# Patient Record
Sex: MALE | Race: WHITE | HISPANIC OR LATINO
[De-identification: names, ages, dates, MRNs, and addresses within clinical notes are randomized per-mention and may not be internally consistent; named-entity substitution may affect disease eponyms.]

---

## 2017-02-05 ENCOUNTER — TRANSCRIPTION ENCOUNTER (OUTPATIENT)
Age: 42
End: 2017-02-05

## 2017-11-09 ENCOUNTER — TRANSCRIPTION ENCOUNTER (OUTPATIENT)
Age: 42
End: 2017-11-09

## 2018-05-30 ENCOUNTER — OUTPATIENT (OUTPATIENT)
Dept: OUTPATIENT SERVICES | Facility: HOSPITAL | Age: 43
LOS: 1 days | Discharge: HOME | End: 2018-05-30

## 2018-05-30 VITALS
SYSTOLIC BLOOD PRESSURE: 150 MMHG | TEMPERATURE: 98 F | DIASTOLIC BLOOD PRESSURE: 86 MMHG | WEIGHT: 315 LBS | RESPIRATION RATE: 16 BRPM | OXYGEN SATURATION: 99 % | HEART RATE: 80 BPM

## 2018-05-30 DIAGNOSIS — Z01.818 ENCOUNTER FOR OTHER PREPROCEDURAL EXAMINATION: ICD-10-CM

## 2018-05-30 DIAGNOSIS — G56.01 CARPAL TUNNEL SYNDROME, RIGHT UPPER LIMB: ICD-10-CM

## 2018-05-30 DIAGNOSIS — Z96.7 PRESENCE OF OTHER BONE AND TENDON IMPLANTS: Chronic | ICD-10-CM

## 2018-05-30 DIAGNOSIS — Z98.890 OTHER SPECIFIED POSTPROCEDURAL STATES: Chronic | ICD-10-CM

## 2018-05-30 LAB
ALBUMIN SERPL ELPH-MCNC: 4.4 G/DL — SIGNIFICANT CHANGE UP (ref 3.5–5.2)
ALP SERPL-CCNC: 69 U/L — SIGNIFICANT CHANGE UP (ref 30–115)
ALT FLD-CCNC: 18 U/L — SIGNIFICANT CHANGE UP (ref 0–41)
ANION GAP SERPL CALC-SCNC: 16 MMOL/L — HIGH (ref 7–14)
APTT BLD: 29.8 SEC — SIGNIFICANT CHANGE UP (ref 27–39.2)
AST SERPL-CCNC: 17 U/L — SIGNIFICANT CHANGE UP (ref 0–41)
BASOPHILS # BLD AUTO: 0.07 K/UL — SIGNIFICANT CHANGE UP (ref 0–0.2)
BASOPHILS NFR BLD AUTO: 0.6 % — SIGNIFICANT CHANGE UP (ref 0–1)
BILIRUB SERPL-MCNC: 0.2 MG/DL — SIGNIFICANT CHANGE UP (ref 0.2–1.2)
BUN SERPL-MCNC: 15 MG/DL — SIGNIFICANT CHANGE UP (ref 10–20)
CALCIUM SERPL-MCNC: 9.3 MG/DL — SIGNIFICANT CHANGE UP (ref 8.5–10.1)
CHLORIDE SERPL-SCNC: 98 MMOL/L — SIGNIFICANT CHANGE UP (ref 98–110)
CO2 SERPL-SCNC: 24 MMOL/L — SIGNIFICANT CHANGE UP (ref 17–32)
CREAT SERPL-MCNC: 0.9 MG/DL — SIGNIFICANT CHANGE UP (ref 0.7–1.5)
EOSINOPHIL # BLD AUTO: 0.3 K/UL — SIGNIFICANT CHANGE UP (ref 0–0.7)
EOSINOPHIL NFR BLD AUTO: 2.5 % — SIGNIFICANT CHANGE UP (ref 0–8)
GLUCOSE SERPL-MCNC: 86 MG/DL — SIGNIFICANT CHANGE UP (ref 70–99)
HCT VFR BLD CALC: 41.1 % — LOW (ref 42–52)
HGB BLD-MCNC: 13.5 G/DL — LOW (ref 14–18)
IMM GRANULOCYTES NFR BLD AUTO: 0.6 % — HIGH (ref 0.1–0.3)
INR BLD: 1.14 RATIO — SIGNIFICANT CHANGE UP (ref 0.65–1.3)
LYMPHOCYTES # BLD AUTO: 2.79 K/UL — SIGNIFICANT CHANGE UP (ref 1.2–3.4)
LYMPHOCYTES # BLD AUTO: 23.1 % — SIGNIFICANT CHANGE UP (ref 20.5–51.1)
MCHC RBC-ENTMCNC: 26.8 PG — LOW (ref 27–31)
MCHC RBC-ENTMCNC: 32.8 G/DL — SIGNIFICANT CHANGE UP (ref 32–37)
MCV RBC AUTO: 81.7 FL — SIGNIFICANT CHANGE UP (ref 80–94)
MONOCYTES # BLD AUTO: 0.53 K/UL — SIGNIFICANT CHANGE UP (ref 0.1–0.6)
MONOCYTES NFR BLD AUTO: 4.4 % — SIGNIFICANT CHANGE UP (ref 1.7–9.3)
NEUTROPHILS # BLD AUTO: 8.31 K/UL — HIGH (ref 1.4–6.5)
NEUTROPHILS NFR BLD AUTO: 68.8 % — SIGNIFICANT CHANGE UP (ref 42.2–75.2)
NRBC # BLD: 0 /100 WBCS — SIGNIFICANT CHANGE UP (ref 0–0)
PLATELET # BLD AUTO: 312 K/UL — SIGNIFICANT CHANGE UP (ref 130–400)
POTASSIUM SERPL-MCNC: 4.4 MMOL/L — SIGNIFICANT CHANGE UP (ref 3.5–5)
POTASSIUM SERPL-SCNC: 4.4 MMOL/L — SIGNIFICANT CHANGE UP (ref 3.5–5)
PROT SERPL-MCNC: 7.2 G/DL — SIGNIFICANT CHANGE UP (ref 6–8)
PROTHROM AB SERPL-ACNC: 12.4 SEC — SIGNIFICANT CHANGE UP (ref 9.95–12.87)
RBC # BLD: 5.03 M/UL — SIGNIFICANT CHANGE UP (ref 4.7–6.1)
RBC # FLD: 14.6 % — HIGH (ref 11.5–14.5)
SODIUM SERPL-SCNC: 138 MMOL/L — SIGNIFICANT CHANGE UP (ref 135–146)
WBC # BLD: 12.07 K/UL — HIGH (ref 4.8–10.8)
WBC # FLD AUTO: 12.07 K/UL — HIGH (ref 4.8–10.8)

## 2018-05-30 NOTE — H&P PST ADULT - PSH
H/O arthroscopy of knee  RT -2017 -SPINAL  S/P ORIF (open reduction internal fixation) fracture  LT ANKLE

## 2018-05-30 NOTE — H&P PST ADULT - PMH
Arthritis  chronic pain RT KNEE  Asthma  RARE EPISODIC-LAST USE RESCUE INHALER ABOUT 2YRS AGO  Chronic neck and back pain    Obesities, morbid

## 2018-05-30 NOTE — H&P PST ADULT - NSANTHOSAYNRD_GEN_A_CORE
No. ABI screening performed.  STOP BANG Legend: 0-2 = LOW Risk; 3-4 = INTERMEDIATE Risk; 5-8 = HIGH Risk

## 2018-05-30 NOTE — H&P PST ADULT - HISTORY OF PRESENT ILLNESS
42YOM, States has numbness and pain b/l hands, rt worse than left, for RT CARPAL TUNNEL RELEASE FIRST. Denies c/o cp ,palp, uri ,fever, rash, sob,  or uti symptoms. Exercise naeem 1 FLAT BLOCK LTD BY PAIN RT KNEE. 42YOM,  has numbness and pain b/l hands, rt worse than left, for RT CARPAL TUNNEL RELEASE FIRST.  was injured by a steel beam at work site on Mar 23, 2017 in [facility] in [place], since then pain and numbness b/l arms, pain rt knee , neck and back.  Denies c/o cp ,palp, uri ,fever, rash, sob,  or uti symptoms. Exercise naeem 1 FLAT BLOCK LTD BY PAIN RT KNEE.

## 2018-06-12 ENCOUNTER — OUTPATIENT (OUTPATIENT)
Dept: OUTPATIENT SERVICES | Facility: HOSPITAL | Age: 43
LOS: 1 days | Discharge: HOME | End: 2018-06-12

## 2018-06-12 VITALS
TEMPERATURE: 98 F | HEART RATE: 70 BPM | SYSTOLIC BLOOD PRESSURE: 120 MMHG | DIASTOLIC BLOOD PRESSURE: 74 MMHG | WEIGHT: 315 LBS | RESPIRATION RATE: 16 BRPM | OXYGEN SATURATION: 97 %

## 2018-06-12 VITALS
HEART RATE: 67 BPM | DIASTOLIC BLOOD PRESSURE: 86 MMHG | RESPIRATION RATE: 17 BRPM | SYSTOLIC BLOOD PRESSURE: 138 MMHG | OXYGEN SATURATION: 98 %

## 2018-06-12 DIAGNOSIS — Z96.7 PRESENCE OF OTHER BONE AND TENDON IMPLANTS: Chronic | ICD-10-CM

## 2018-06-12 DIAGNOSIS — Z98.890 OTHER SPECIFIED POSTPROCEDURAL STATES: Chronic | ICD-10-CM

## 2018-06-12 RX ORDER — ACETAMINOPHEN 500 MG
975 TABLET ORAL ONCE
Qty: 0 | Refills: 0 | Status: COMPLETED | OUTPATIENT
Start: 2018-06-12 | End: 2018-06-12

## 2018-06-12 RX ORDER — ONDANSETRON 8 MG/1
4 TABLET, FILM COATED ORAL ONCE
Qty: 0 | Refills: 0 | Status: DISCONTINUED | OUTPATIENT
Start: 2018-06-12 | End: 2018-06-27

## 2018-06-12 RX ORDER — HYDROMORPHONE HYDROCHLORIDE 2 MG/ML
0.5 INJECTION INTRAMUSCULAR; INTRAVENOUS; SUBCUTANEOUS
Qty: 0 | Refills: 0 | Status: DISCONTINUED | OUTPATIENT
Start: 2018-06-12 | End: 2018-06-12

## 2018-06-12 RX ORDER — SODIUM CHLORIDE 9 MG/ML
1000 INJECTION, SOLUTION INTRAVENOUS
Qty: 0 | Refills: 0 | Status: DISCONTINUED | OUTPATIENT
Start: 2018-06-12 | End: 2018-06-27

## 2018-06-12 RX ORDER — HYDROMORPHONE HYDROCHLORIDE 2 MG/ML
1 INJECTION INTRAMUSCULAR; INTRAVENOUS; SUBCUTANEOUS
Qty: 0 | Refills: 0 | Status: DISCONTINUED | OUTPATIENT
Start: 2018-06-12 | End: 2018-06-12

## 2018-06-12 RX ORDER — SODIUM CHLORIDE 9 MG/ML
500 INJECTION, SOLUTION INTRAVENOUS ONCE
Qty: 0 | Refills: 0 | Status: COMPLETED | OUTPATIENT
Start: 2018-06-12 | End: 2018-06-12

## 2018-06-12 RX ORDER — OXYCODONE AND ACETAMINOPHEN 5; 325 MG/1; MG/1
1 TABLET ORAL EVERY 4 HOURS
Qty: 0 | Refills: 0 | Status: DISCONTINUED | OUTPATIENT
Start: 2018-06-12 | End: 2018-06-12

## 2018-06-12 RX ADMIN — SODIUM CHLORIDE 1000 MILLILITER(S): 9 INJECTION, SOLUTION INTRAVENOUS at 13:24

## 2018-06-12 RX ADMIN — Medication 975 MILLIGRAM(S): at 13:56

## 2018-06-12 RX ADMIN — SODIUM CHLORIDE 100 MILLILITER(S): 9 INJECTION, SOLUTION INTRAVENOUS at 13:52

## 2018-06-12 NOTE — ASU DISCHARGE PLAN (ADULT/PEDIATRIC). - SPECIAL INSTRUCTIONS
DIET:    Resume normal diet  No alcoholic  beverages for 24 hours or if on prescribed narcotic pain medications.    MEDICATION:    Resume your preoperative oral medications.  Check with your physician before starting aspirin, Coumadin, or other blood thinners.  Prescriptions given to you - take as directed.      ACTIVITY:    Rest today and limit your activities for 24 hours.  Do not drive or operate machinery for 24 hours - if you received anesthesia.  When taking pain medication, be careful as you walk or climb stairs.  It is not advisable to drive while taking prescribed pain medication.    SPECIAL INSTRUCTIONS:    ___x__ Elevate operative site above heart level or as directed.  __x___ Apply ice to operative site as directed.  _____ Use  sling as directed.  ___x__ Exercise fingers.    DRESSING CARE:    __x___ You may change the dressing 4 days. Keep wound covered with band-aids.  _____ Do not change the dressing until your doctor see you.  _____ You can loosen or rewrap the dressing.  _____  Keep dressing clean and dry.  _____ You may shower in _____ day(s) with the extremity covered by a plastic bag.  ___x__ OK to wash hand , including showers, in __4___ day(s).    ADDITIONAL  INFORMATION:    Post operative visit should be scheduled for next week.  If you are not aware of visit please contact office.  If you have any questions or concerns call office at      Notify your doctor if you develop   Fever  Excessive Swelling  Chills   Drainage   Pain not controlled by medication  Persistent numbness in hand or fingers    If an Emergency arises call 911 and/or go to the Emergency Room

## 2018-06-12 NOTE — BRIEF OPERATIVE NOTE - PROCEDURE
<<-----Click on this checkbox to enter Procedure Open release of right carpal tunnel  06/12/2018    Active  HARSHA

## 2018-06-14 DIAGNOSIS — G56.01 CARPAL TUNNEL SYNDROME, RIGHT UPPER LIMB: ICD-10-CM

## 2018-06-14 DIAGNOSIS — E66.01 MORBID (SEVERE) OBESITY DUE TO EXCESS CALORIES: ICD-10-CM

## 2018-09-25 PROBLEM — E66.01 MORBID (SEVERE) OBESITY DUE TO EXCESS CALORIES: Chronic | Status: ACTIVE | Noted: 2018-05-30

## 2018-09-25 PROBLEM — J45.909 UNSPECIFIED ASTHMA, UNCOMPLICATED: Chronic | Status: ACTIVE | Noted: 2018-05-30

## 2018-09-25 PROBLEM — M54.2 CERVICALGIA: Chronic | Status: ACTIVE | Noted: 2018-05-30

## 2018-10-10 ENCOUNTER — OUTPATIENT (OUTPATIENT)
Dept: OUTPATIENT SERVICES | Facility: HOSPITAL | Age: 43
LOS: 1 days | Discharge: HOME | End: 2018-10-10

## 2018-10-10 VITALS
WEIGHT: 315 LBS | DIASTOLIC BLOOD PRESSURE: 86 MMHG | RESPIRATION RATE: 17 BRPM | TEMPERATURE: 97 F | HEART RATE: 76 BPM | SYSTOLIC BLOOD PRESSURE: 131 MMHG | OXYGEN SATURATION: 98 % | HEIGHT: 70 IN

## 2018-10-10 DIAGNOSIS — Z01.818 ENCOUNTER FOR OTHER PREPROCEDURAL EXAMINATION: ICD-10-CM

## 2018-10-10 DIAGNOSIS — G56.02 CARPAL TUNNEL SYNDROME, LEFT UPPER LIMB: ICD-10-CM

## 2018-10-10 DIAGNOSIS — Z98.890 OTHER SPECIFIED POSTPROCEDURAL STATES: Chronic | ICD-10-CM

## 2018-10-10 DIAGNOSIS — Z96.7 PRESENCE OF OTHER BONE AND TENDON IMPLANTS: Chronic | ICD-10-CM

## 2018-10-10 LAB
ALBUMIN SERPL ELPH-MCNC: 4.5 G/DL — SIGNIFICANT CHANGE UP (ref 3.5–5.2)
ALP SERPL-CCNC: 73 U/L — SIGNIFICANT CHANGE UP (ref 30–115)
ALT FLD-CCNC: 23 U/L — SIGNIFICANT CHANGE UP (ref 0–41)
ANION GAP SERPL CALC-SCNC: 19 MMOL/L — HIGH (ref 7–14)
APTT BLD: 30.8 SEC — SIGNIFICANT CHANGE UP (ref 27–39.2)
AST SERPL-CCNC: 16 U/L — SIGNIFICANT CHANGE UP (ref 0–41)
BASOPHILS # BLD AUTO: 0.05 K/UL — SIGNIFICANT CHANGE UP (ref 0–0.2)
BASOPHILS NFR BLD AUTO: 0.6 % — SIGNIFICANT CHANGE UP (ref 0–1)
BILIRUB SERPL-MCNC: <0.2 MG/DL — SIGNIFICANT CHANGE UP (ref 0.2–1.2)
BUN SERPL-MCNC: 21 MG/DL — HIGH (ref 10–20)
CALCIUM SERPL-MCNC: 9.2 MG/DL — SIGNIFICANT CHANGE UP (ref 8.5–10.1)
CHLORIDE SERPL-SCNC: 98 MMOL/L — SIGNIFICANT CHANGE UP (ref 98–110)
CO2 SERPL-SCNC: 22 MMOL/L — SIGNIFICANT CHANGE UP (ref 17–32)
CREAT SERPL-MCNC: 1 MG/DL — SIGNIFICANT CHANGE UP (ref 0.7–1.5)
EOSINOPHIL # BLD AUTO: 0.3 K/UL — SIGNIFICANT CHANGE UP (ref 0–0.7)
EOSINOPHIL NFR BLD AUTO: 3.3 % — SIGNIFICANT CHANGE UP (ref 0–8)
GLUCOSE SERPL-MCNC: 80 MG/DL — SIGNIFICANT CHANGE UP (ref 70–99)
HCT VFR BLD CALC: 41.8 % — LOW (ref 42–52)
HGB BLD-MCNC: 13.6 G/DL — LOW (ref 14–18)
IMM GRANULOCYTES NFR BLD AUTO: 0.4 % — HIGH (ref 0.1–0.3)
INR BLD: 1.12 RATIO — SIGNIFICANT CHANGE UP (ref 0.65–1.3)
LYMPHOCYTES # BLD AUTO: 2.78 K/UL — SIGNIFICANT CHANGE UP (ref 1.2–3.4)
LYMPHOCYTES # BLD AUTO: 31 % — SIGNIFICANT CHANGE UP (ref 20.5–51.1)
MCHC RBC-ENTMCNC: 27.1 PG — SIGNIFICANT CHANGE UP (ref 27–31)
MCHC RBC-ENTMCNC: 32.5 G/DL — SIGNIFICANT CHANGE UP (ref 32–37)
MCV RBC AUTO: 83.4 FL — SIGNIFICANT CHANGE UP (ref 80–94)
MONOCYTES # BLD AUTO: 0.56 K/UL — SIGNIFICANT CHANGE UP (ref 0.1–0.6)
MONOCYTES NFR BLD AUTO: 6.3 % — SIGNIFICANT CHANGE UP (ref 1.7–9.3)
NEUTROPHILS # BLD AUTO: 5.23 K/UL — SIGNIFICANT CHANGE UP (ref 1.4–6.5)
NEUTROPHILS NFR BLD AUTO: 58.4 % — SIGNIFICANT CHANGE UP (ref 42.2–75.2)
PLATELET # BLD AUTO: 334 K/UL — SIGNIFICANT CHANGE UP (ref 130–400)
POTASSIUM SERPL-MCNC: 5.1 MMOL/L — HIGH (ref 3.5–5)
POTASSIUM SERPL-SCNC: 5.1 MMOL/L — HIGH (ref 3.5–5)
PROT SERPL-MCNC: 7.5 G/DL — SIGNIFICANT CHANGE UP (ref 6–8)
PROTHROM AB SERPL-ACNC: 12.1 SEC — SIGNIFICANT CHANGE UP (ref 9.95–12.87)
RBC # BLD: 5.01 M/UL — SIGNIFICANT CHANGE UP (ref 4.7–6.1)
RBC # FLD: 14.4 % — SIGNIFICANT CHANGE UP (ref 11.5–14.5)
SODIUM SERPL-SCNC: 139 MMOL/L — SIGNIFICANT CHANGE UP (ref 135–146)
WBC # BLD: 8.96 K/UL — SIGNIFICANT CHANGE UP (ref 4.8–10.8)
WBC # FLD AUTO: 8.96 K/UL — SIGNIFICANT CHANGE UP (ref 4.8–10.8)

## 2018-10-10 NOTE — H&P PST ADULT - PMH
Arthritis  chronic pain RT KNEE  Asthma  RARE EPISODIC-LAST USE RESCUE INHALER ABOUT 2YRS AGO  Bilateral carpal tunnel syndrome    Chronic neck and back pain    Obesities, morbid

## 2018-10-10 NOTE — H&P PST ADULT - REASON FOR ADMISSION
43 yr old Male with hx of Carpal tunnel Syndrome. Pt electing to have Carpal Tunnel Release with Dr. Manzanares on 10/24/18

## 2018-10-10 NOTE — H&P PST ADULT - PSH
H/O arthroscopy of knee  RT -2017 -SPINAL  History of carpal tunnel release of both wrists  06/2018 RIght  S/P ORIF (open reduction internal fixation) fracture  LT ANKLE

## 2018-10-10 NOTE — H&P PST ADULT - HISTORY OF PRESENT ILLNESS
43 yr old Male with hx of Carpal tunnel Syndrome. Pt electing to have Carpal Tunnel Release   Pt at present denies any CP, SOB, Palpitations, or Dysuria  PT states he can climb 1 FOS with no SOB  Pt denies ABI    As per pt this is a complete medical and surgical assessment including prescribed and OTC medications 43 yr old morbidly obese Male ambulates with cane due to prior RT ankle sx and LT knee issues with hx of Carpal tunnel Syndrome. Pt electing to have Carpal Tunnel Release   Pt at present denies any CP, SOB, Palpitations, or Dysuria  PT states he can climb 1 FOS with no SOB  Pt denies ABI    As per pt this is a complete medical and surgical assessment including prescribed and OTC medications      As per pt this is a worker's comp case. He had a work related injury

## 2018-10-10 NOTE — H&P PST ADULT - FAMILY HISTORY
Diabetes, kideny transplant 3 yrs ago     Father  Still living? No  Cirrhosis of liver, Age at diagnosis: Age Unknown

## 2018-10-24 ENCOUNTER — OUTPATIENT (OUTPATIENT)
Dept: OUTPATIENT SERVICES | Facility: HOSPITAL | Age: 43
LOS: 1 days | Discharge: HOME | End: 2018-10-24

## 2018-10-24 VITALS
OXYGEN SATURATION: 96 % | SYSTOLIC BLOOD PRESSURE: 140 MMHG | HEART RATE: 76 BPM | DIASTOLIC BLOOD PRESSURE: 65 MMHG | RESPIRATION RATE: 19 BRPM

## 2018-10-24 VITALS
HEIGHT: 70 IN | DIASTOLIC BLOOD PRESSURE: 65 MMHG | OXYGEN SATURATION: 96 % | RESPIRATION RATE: 18 BRPM | WEIGHT: 315 LBS | HEART RATE: 76 BPM | TEMPERATURE: 99 F | SYSTOLIC BLOOD PRESSURE: 128 MMHG

## 2018-10-24 DIAGNOSIS — Z96.7 PRESENCE OF OTHER BONE AND TENDON IMPLANTS: Chronic | ICD-10-CM

## 2018-10-24 DIAGNOSIS — Z98.890 OTHER SPECIFIED POSTPROCEDURAL STATES: Chronic | ICD-10-CM

## 2018-10-24 RX ORDER — ACETAMINOPHEN 500 MG
650 TABLET ORAL ONCE
Qty: 0 | Refills: 0 | Status: DISCONTINUED | OUTPATIENT
Start: 2018-10-24 | End: 2018-11-08

## 2018-10-24 RX ORDER — OXYCODONE AND ACETAMINOPHEN 5; 325 MG/1; MG/1
1 TABLET ORAL ONCE
Qty: 0 | Refills: 0 | Status: DISCONTINUED | OUTPATIENT
Start: 2018-10-24 | End: 2018-10-24

## 2018-10-24 RX ORDER — HYDROMORPHONE HYDROCHLORIDE 2 MG/ML
0.5 INJECTION INTRAMUSCULAR; INTRAVENOUS; SUBCUTANEOUS
Qty: 0 | Refills: 0 | Status: DISCONTINUED | OUTPATIENT
Start: 2018-10-24 | End: 2018-10-24

## 2018-10-24 RX ORDER — KETOROLAC TROMETHAMINE 30 MG/ML
15 SYRINGE (ML) INJECTION ONCE
Qty: 0 | Refills: 0 | Status: COMPLETED | OUTPATIENT
Start: 2018-10-24 | End: 2018-10-29

## 2018-10-24 RX ORDER — SODIUM CHLORIDE 9 MG/ML
1000 INJECTION, SOLUTION INTRAVENOUS
Qty: 0 | Refills: 0 | Status: DISCONTINUED | OUTPATIENT
Start: 2018-10-24 | End: 2018-11-08

## 2018-10-24 RX ORDER — KETOROLAC TROMETHAMINE 30 MG/ML
15 SYRINGE (ML) INJECTION ONCE
Qty: 0 | Refills: 0 | Status: DISCONTINUED | OUTPATIENT
Start: 2018-10-24 | End: 2018-10-24

## 2018-10-24 RX ORDER — ACETAMINOPHEN 500 MG
1000 TABLET ORAL ONCE
Qty: 0 | Refills: 0 | Status: DISCONTINUED | OUTPATIENT
Start: 2018-10-24 | End: 2018-11-08

## 2018-10-24 RX ORDER — ONDANSETRON 8 MG/1
4 TABLET, FILM COATED ORAL ONCE
Qty: 0 | Refills: 0 | Status: DISCONTINUED | OUTPATIENT
Start: 2018-10-24 | End: 2018-11-08

## 2018-10-24 RX ORDER — IBUPROFEN 200 MG
1 TABLET ORAL
Qty: 20 | Refills: 0
Start: 2018-10-24

## 2018-10-24 RX ADMIN — SODIUM CHLORIDE 75 MILLILITER(S): 9 INJECTION, SOLUTION INTRAVENOUS at 16:09

## 2018-10-24 NOTE — BRIEF OPERATIVE NOTE - PROCEDURE
<<-----Click on this checkbox to enter Procedure Open release of left carpal tunnel  10/24/2018    Active  HARSHA

## 2018-10-24 NOTE — ASU DISCHARGE PLAN (ADULT/PEDIATRIC). - SPECIAL INSTRUCTIONS
DIET:    Resume normal diet  No alcoholic  beverages for 24 hours or if on prescribed narcotic pain medications.    MEDICATION:    Resume your preoperative oral medications.  Check with your physician before starting aspirin, Coumadin, or other blood thinners.  Prescriptions given to you - take as directed.      ACTIVITY:    Rest today and limit your activities for 24 hours.  Do not drive or operate machinery for 24 hours - if you received anesthesia.  When taking pain medication, be careful as you walk or climb stairs.  It is not advisable to drive while taking prescribed pain medication.    SPECIAL INSTRUCTIONS:    ___x__ Elevate operative site above heart level or as directed.  __x___ Apply ice to operative site as directed.  _____ Use  sling as directed.  ___x__ Exercise fingers.    DRESSING CARE:    ___x__ You may change the dressing 4 days. Keep wound covered with band-aids.  _____ Do not change the dressing until your doctor see you.  _____ You can loosen or rewrap the dressing.  _____  Keep dressing clean and dry.  _____ You may shower in _____ day(s) with the extremity covered by a plastic bag.  __x___ OK to wash hand , including showers, in _4____ day(s).    ADDITIONAL  INFORMATION:    Post operative visit should be scheduled for next week.  If you are not aware of visit please contact office.  If you have any questions or concerns call office at      Notify your doctor if you develop   Fever  Excessive Swelling  Chills   Drainage   Pain not controlled by medication  Persistent numbness in hand or fingers    If an Emergency arises call 911 and/or go to the Emergency Room

## 2018-10-30 DIAGNOSIS — E66.01 MORBID (SEVERE) OBESITY DUE TO EXCESS CALORIES: ICD-10-CM

## 2018-10-30 DIAGNOSIS — J45.909 UNSPECIFIED ASTHMA, UNCOMPLICATED: ICD-10-CM

## 2018-10-30 DIAGNOSIS — F17.210 NICOTINE DEPENDENCE, CIGARETTES, UNCOMPLICATED: ICD-10-CM

## 2018-10-30 DIAGNOSIS — G89.29 OTHER CHRONIC PAIN: ICD-10-CM

## 2018-10-30 DIAGNOSIS — Z79.82 LONG TERM (CURRENT) USE OF ASPIRIN: ICD-10-CM

## 2018-10-30 DIAGNOSIS — G56.02 CARPAL TUNNEL SYNDROME, LEFT UPPER LIMB: ICD-10-CM

## 2018-10-30 DIAGNOSIS — Z83.3 FAMILY HISTORY OF DIABETES MELLITUS: ICD-10-CM

## 2019-06-25 NOTE — H&P PST ADULT - MUSCULOSKELETAL COMMENTS
PT complaints of general body aches Complex Repair Preamble Text (Leave Blank If You Do Not Want): Extensive wide undermining was performed.

## 2020-08-26 DIAGNOSIS — Z86.59 PERSONAL HISTORY OF OTHER MENTAL AND BEHAVIORAL DISORDERS: ICD-10-CM

## 2020-08-26 PROBLEM — Z00.00 ENCOUNTER FOR PREVENTIVE HEALTH EXAMINATION: Status: ACTIVE | Noted: 2020-08-26

## 2020-08-26 RX ORDER — TRAMADOL HYDROCHLORIDE 50 MG/1
50 TABLET, COATED ORAL EVERY 6 HOURS
Refills: 0 | Status: ACTIVE | COMMUNITY

## 2020-08-26 RX ORDER — GABAPENTIN 800 MG/1
800 TABLET, COATED ORAL 4 TIMES DAILY
Refills: 0 | Status: ACTIVE | COMMUNITY

## 2020-09-04 ENCOUNTER — APPOINTMENT (OUTPATIENT)
Dept: SURGERY | Facility: CLINIC | Age: 45
End: 2020-09-04
Payer: MEDICARE

## 2020-09-04 VITALS
TEMPERATURE: 98.2 F | HEIGHT: 67 IN | BODY MASS INDEX: 49.44 KG/M2 | SYSTOLIC BLOOD PRESSURE: 136 MMHG | DIASTOLIC BLOOD PRESSURE: 84 MMHG | WEIGHT: 315 LBS

## 2020-09-04 DIAGNOSIS — F17.200 NICOTINE DEPENDENCE, UNSPECIFIED, UNCOMPLICATED: ICD-10-CM

## 2020-09-04 DIAGNOSIS — Z83.3 FAMILY HISTORY OF DIABETES MELLITUS: ICD-10-CM

## 2020-09-04 PROCEDURE — 99204 OFFICE O/P NEW MOD 45 MIN: CPT

## 2020-09-09 ENCOUNTER — APPOINTMENT (OUTPATIENT)
Dept: SURGERY | Facility: CLINIC | Age: 45
End: 2020-09-09

## 2020-09-16 ENCOUNTER — APPOINTMENT (OUTPATIENT)
Dept: SURGERY | Facility: CLINIC | Age: 45
End: 2020-09-16
Payer: SELF-PAY

## 2020-09-16 PROCEDURE — SI006: CPT

## 2020-10-01 ENCOUNTER — APPOINTMENT (OUTPATIENT)
Dept: SURGERY | Facility: CLINIC | Age: 45
End: 2020-10-01
Payer: MEDICARE

## 2020-10-01 VITALS — HEIGHT: 67 IN | BODY MASS INDEX: 49.44 KG/M2 | WEIGHT: 315 LBS

## 2020-10-01 PROCEDURE — ZZZZZ: CPT

## 2020-10-02 ENCOUNTER — TRANSCRIPTION ENCOUNTER (OUTPATIENT)
Age: 45
End: 2020-10-02

## 2020-10-12 ENCOUNTER — OUTPATIENT (OUTPATIENT)
Dept: OUTPATIENT SERVICES | Facility: HOSPITAL | Age: 45
LOS: 1 days | Discharge: HOME | End: 2020-10-12

## 2020-10-12 DIAGNOSIS — F50.9 EATING DISORDER, UNSPECIFIED: ICD-10-CM

## 2020-10-12 DIAGNOSIS — Z98.890 OTHER SPECIFIED POSTPROCEDURAL STATES: Chronic | ICD-10-CM

## 2020-10-12 DIAGNOSIS — Z96.7 PRESENCE OF OTHER BONE AND TENDON IMPLANTS: Chronic | ICD-10-CM

## 2020-10-13 ENCOUNTER — OUTPATIENT (OUTPATIENT)
Dept: OUTPATIENT SERVICES | Facility: HOSPITAL | Age: 45
LOS: 1 days | Discharge: HOME | End: 2020-10-13

## 2020-10-13 DIAGNOSIS — M17.10 UNILATERAL PRIMARY OSTEOARTHRITIS, UNSPECIFIED KNEE: ICD-10-CM

## 2020-10-13 DIAGNOSIS — Z98.890 OTHER SPECIFIED POSTPROCEDURAL STATES: Chronic | ICD-10-CM

## 2020-10-13 DIAGNOSIS — Z96.7 PRESENCE OF OTHER BONE AND TENDON IMPLANTS: Chronic | ICD-10-CM

## 2020-10-13 DIAGNOSIS — M54.5 LOW BACK PAIN: ICD-10-CM

## 2020-10-16 DIAGNOSIS — F50.9 EATING DISORDER, UNSPECIFIED: ICD-10-CM

## 2020-10-21 NOTE — PLAN
[FreeTextEntry1] : PLAN: Smoking cessation.\par            Education class.\par            Nutrition evaluation.\par            Call with concerns.

## 2020-10-21 NOTE — CONSULT LETTER
[Courtesy Letter:] : I had the pleasure of seeing your patient, [unfilled], in my office today. [Please see my note below.] : Please see my note below. [Sincerely,] : Sincerely, [Consult Closing:] : Thank you very much for allowing me to participate in the care of this patient.  If you have any questions, please do not hesitate to contact me. [Dear  ___] : Dear  [unfilled], [FreeTextEntry3] : Mansi Little MD, FACS, FASMBS, Diplo ABOM\par Director, Comprehensive [facility]\par Director, Medical Weight [facility]\par Chief, General, Bariatric & Minimally Invasive Surgery\par Director, Quality & Performance Improvement\par Department of Surgery\par [facility] \par [facility]\par

## 2020-10-21 NOTE — ASSESSMENT
[FreeTextEntry1] : RIGOBERTO LAKE is a 45 year male seen today for Bariatric consultation. The surgical options for weight loss have been extensively discussed with the patient and questions answered. The patient was provided written education regarding the Sleeve Gastrectomy. The patient appears to have a reasonable understanding of the process and is ready to proceed.  Risks, including but not limited to:  anesthesia, death, bleeding, infection, leaks, blood clots, ulcers, malnutrition and need for additional operations have been reviewed.  The importance of a consistent diet and exercise regimen in regards to weight loss and maintenance has also been discussed and the patient agrees to actively participate in the process.  The importance of lifelong mineral and vitamin supplementation was also reviewed with the patient. The need to adhere to an appropriate diet and exercise regimen were emphasized; in particular the need to perform moderate to intense physical activity most days of the week.  No promises have been made in regards to any given outcome and possibility of inadequate weight loss as well as regain has been discussed with the patient.  The patient understands that a long-term commitment is necessary for long-term success.\par

## 2020-10-21 NOTE — PHYSICAL EXAM
[Normal] : affect appropriate [de-identified] : Mallampati IV [de-identified] : Soft, nondistended.

## 2020-10-21 NOTE — REVIEW OF SYSTEMS
[Joint Pain] : joint pain [Joint Stiffness] : joint stiffness [Muscle Pain] : muscle pain [Negative] : Endocrine [FreeTextEntry9] : knees and back

## 2020-10-21 NOTE — HISTORY OF PRESENT ILLNESS
[de-identified] : 45 year old male with a BMI of 65 who presents today for Bariatric consultation. He has tried multiple weight loss modalities in the past such as portion control and a Low CHO diet without any long term success. He has been obese for several years and he is seeking surgery for improvement of his overall health and comorbid conditions. Patient states that since his work related accident in 2017, he has been suffering from severe pain in his knees and back. He ambulates either with 2 canes or a walker. He is not able to walk for more that 5 minutes before he has to rest. Encouraged patient to do what he is able to every few hours. He has over a 30 year history of smoking and currently smokes 2 PPD. Smoking cessation was strongly advised as we will not proceed with surgery unless he is nicotine free for 2 months.

## 2020-11-05 ENCOUNTER — APPOINTMENT (OUTPATIENT)
Dept: SURGERY | Facility: CLINIC | Age: 45
End: 2020-11-05
Payer: MEDICARE

## 2020-11-05 VITALS — WEIGHT: 315 LBS | BODY MASS INDEX: 49.44 KG/M2 | HEIGHT: 67 IN

## 2020-11-05 PROCEDURE — ZZZZZ: CPT

## 2020-11-20 ENCOUNTER — OUTPATIENT (OUTPATIENT)
Dept: OUTPATIENT SERVICES | Facility: HOSPITAL | Age: 45
LOS: 1 days | Discharge: HOME | End: 2020-11-20

## 2020-11-20 DIAGNOSIS — Z11.59 ENCOUNTER FOR SCREENING FOR OTHER VIRAL DISEASES: ICD-10-CM

## 2020-11-20 DIAGNOSIS — Z96.7 PRESENCE OF OTHER BONE AND TENDON IMPLANTS: Chronic | ICD-10-CM

## 2020-11-20 DIAGNOSIS — Z98.890 OTHER SPECIFIED POSTPROCEDURAL STATES: Chronic | ICD-10-CM

## 2020-11-23 ENCOUNTER — TRANSCRIPTION ENCOUNTER (OUTPATIENT)
Age: 45
End: 2020-11-23

## 2020-11-23 ENCOUNTER — OUTPATIENT (OUTPATIENT)
Dept: OUTPATIENT SERVICES | Facility: HOSPITAL | Age: 45
LOS: 1 days | Discharge: HOME | End: 2020-11-23
Payer: MEDICARE

## 2020-11-23 ENCOUNTER — RESULT REVIEW (OUTPATIENT)
Age: 45
End: 2020-11-23

## 2020-11-23 VITALS
SYSTOLIC BLOOD PRESSURE: 163 MMHG | DIASTOLIC BLOOD PRESSURE: 93 MMHG | HEART RATE: 99 BPM | OXYGEN SATURATION: 97 % | RESPIRATION RATE: 18 BRPM

## 2020-11-23 VITALS
SYSTOLIC BLOOD PRESSURE: 141 MMHG | DIASTOLIC BLOOD PRESSURE: 91 MMHG | HEIGHT: 71 IN | TEMPERATURE: 98 F | WEIGHT: 315 LBS | HEART RATE: 91 BPM | RESPIRATION RATE: 18 BRPM

## 2020-11-23 DIAGNOSIS — Z98.890 OTHER SPECIFIED POSTPROCEDURAL STATES: Chronic | ICD-10-CM

## 2020-11-23 DIAGNOSIS — Z96.7 PRESENCE OF OTHER BONE AND TENDON IMPLANTS: Chronic | ICD-10-CM

## 2020-11-23 PROCEDURE — 88312 SPECIAL STAINS GROUP 1: CPT | Mod: 26

## 2020-11-23 PROCEDURE — 88305 TISSUE EXAM BY PATHOLOGIST: CPT | Mod: 26

## 2020-11-23 NOTE — H&P PST ADULT - NSICDXPASTSURGICALHX_GEN_ALL_CORE_FT
PAST SURGICAL HISTORY:  H/O arthroscopy of knee RT -2017 -SPINAL    History of carpal tunnel release of both wrists 06/2018 RIght    S/P ORIF (open reduction internal fixation) fracture LT ANKLE

## 2020-11-23 NOTE — CHART NOTE - NSCHARTNOTEFT_GEN_A_CORE
PACU ANESTHESIA ADMISSION NOTE      Procedure: EGD  Post op diagnosis:  GERD    _  __X__  Patent Airway    __X__  Full return of protective reflexes    __X__  Full recovery from anesthesia / back to baseline     Vitals:   T:    97.712       R:                  BP:         143.86         Sat:   99%                P: 87      Mental Status:  __X__ Awake   _____ Alert   _____ Drowsy   _____ Sedated    Nausea/Vomiting:  _X___ NO  ______Yes,   See Post - Op Orders          Pain Scale (0-10):  _____    Treatment: ____ None    _X___ See Post - Op/PCA Orders    Post - Operative Fluids:   ____ Oral   ___X_ See Post - Op Orders    Plan: Discharge:   __X__Home       _____Floor     _____Critical Care    _____  Other:_________________    Comments:    Pt tolerated procedure well, no anesthesia related complications. Care of pt endorsed to PACU, report given to PACU RN. Discharge when criteria are met.

## 2020-11-23 NOTE — H&P PST ADULT - VENOUS THROMBOEMBOLISM
Received fax from [facility] requesting visit notes from 2/27/20 visit from Dr. Logan Ritchie. Authorization For Release of Medical Record Information was received as patient has a visit with PCP.   Visit notes were faxed at 11:14 am.
no

## 2020-11-23 NOTE — H&P PST ADULT - NSICDXPASTMEDICALHX_GEN_ALL_CORE_FT
PAST MEDICAL HISTORY:  Arthritis chronic pain RT KNEE    Asthma RARE EPISODIC-LAST USE RESCUE INHALER ABOUT 2YRS AGO    Bilateral carpal tunnel syndrome     Chronic neck and back pain     Obesities, morbid

## 2020-11-23 NOTE — PRE-ANESTHESIA EVALUATION ADULT - NSANTHPMHFT_GEN_ALL_CORE
morbid obesity  likely ABI  smoker 1 PPD X 30 yrs  chric pain on tramadol,   walk with cane support  last asthma attack 2 yrs ago  lungs clear, no murmur

## 2020-11-23 NOTE — H&P PST ADULT - NSICDXFAMILYHX_GEN_ALL_CORE_FT
FAMILY HISTORY:  Diabetes, kideny transplant 3 yrs ago    Father  Still living? No  Cirrhosis of liver, Age at diagnosis: Age Unknown

## 2020-11-23 NOTE — ASU DISCHARGE PLAN (ADULT/PEDIATRIC) - CALL YOUR DOCTOR IF YOU HAVE ANY OF THE FOLLOWING:
Pain not relieved by Medications/Fever greater than (need to indicate Fahrenheit or Celsius)/Bleeding that does not stop/Inability to tolerate liquids or foods

## 2020-11-25 LAB — SURGICAL PATHOLOGY STUDY: SIGNIFICANT CHANGE UP

## 2020-11-26 DIAGNOSIS — E66.01 MORBID (SEVERE) OBESITY DUE TO EXCESS CALORIES: ICD-10-CM

## 2020-11-26 DIAGNOSIS — J45.909 UNSPECIFIED ASTHMA, UNCOMPLICATED: ICD-10-CM

## 2020-11-26 DIAGNOSIS — Z79.82 LONG TERM (CURRENT) USE OF ASPIRIN: ICD-10-CM

## 2020-11-26 DIAGNOSIS — K20.90 ESOPHAGITIS, UNSPECIFIED WITHOUT BLEEDING: ICD-10-CM

## 2020-11-26 DIAGNOSIS — G89.29 OTHER CHRONIC PAIN: ICD-10-CM

## 2020-11-26 DIAGNOSIS — Z01.818 ENCOUNTER FOR OTHER PREPROCEDURAL EXAMINATION: ICD-10-CM

## 2020-11-26 DIAGNOSIS — K29.50 UNSPECIFIED CHRONIC GASTRITIS WITHOUT BLEEDING: ICD-10-CM

## 2020-12-03 ENCOUNTER — APPOINTMENT (OUTPATIENT)
Dept: SURGERY | Facility: CLINIC | Age: 45
End: 2020-12-03
Payer: MEDICARE

## 2020-12-03 VITALS — WEIGHT: 315 LBS | HEIGHT: 67 IN | BODY MASS INDEX: 49.44 KG/M2

## 2020-12-03 PROCEDURE — ZZZZZ: CPT

## 2021-01-14 ENCOUNTER — APPOINTMENT (OUTPATIENT)
Dept: SURGERY | Facility: CLINIC | Age: 46
End: 2021-01-14
Payer: MEDICARE

## 2021-01-14 VITALS — WEIGHT: 315 LBS | BODY MASS INDEX: 49.44 KG/M2 | HEIGHT: 67 IN

## 2021-01-14 PROCEDURE — ZZZZZ: CPT

## 2021-01-17 ENCOUNTER — OUTPATIENT (OUTPATIENT)
Dept: OUTPATIENT SERVICES | Facility: HOSPITAL | Age: 46
LOS: 1 days | Discharge: HOME | End: 2021-01-17
Payer: MEDICARE

## 2021-01-17 ENCOUNTER — RESULT REVIEW (OUTPATIENT)
Age: 46
End: 2021-01-17

## 2021-01-17 DIAGNOSIS — E66.01 MORBID (SEVERE) OBESITY DUE TO EXCESS CALORIES: ICD-10-CM

## 2021-01-17 DIAGNOSIS — Z98.890 OTHER SPECIFIED POSTPROCEDURAL STATES: Chronic | ICD-10-CM

## 2021-01-17 DIAGNOSIS — Z96.7 PRESENCE OF OTHER BONE AND TENDON IMPLANTS: Chronic | ICD-10-CM

## 2021-01-17 DIAGNOSIS — R10.9 UNSPECIFIED ABDOMINAL PAIN: ICD-10-CM

## 2021-01-17 PROCEDURE — 76700 US EXAM ABDOM COMPLETE: CPT | Mod: 26

## 2021-01-19 ENCOUNTER — NON-APPOINTMENT (OUTPATIENT)
Age: 46
End: 2021-01-19

## 2021-01-20 ENCOUNTER — NON-APPOINTMENT (OUTPATIENT)
Age: 46
End: 2021-01-20

## 2021-02-11 ENCOUNTER — APPOINTMENT (OUTPATIENT)
Dept: SURGERY | Facility: CLINIC | Age: 46
End: 2021-02-11
Payer: MEDICARE

## 2021-02-11 VITALS — HEIGHT: 67 IN | WEIGHT: 315 LBS | BODY MASS INDEX: 49.44 KG/M2

## 2021-02-11 PROCEDURE — ZZZZZ: CPT

## 2021-03-18 ENCOUNTER — APPOINTMENT (OUTPATIENT)
Dept: SURGERY | Facility: CLINIC | Age: 46
End: 2021-03-18
Payer: MEDICARE

## 2021-03-18 VITALS — HEIGHT: 67 IN | BODY MASS INDEX: 49.44 KG/M2 | WEIGHT: 315 LBS

## 2021-03-18 PROCEDURE — ZZZZZ: CPT

## 2021-03-29 ENCOUNTER — LABORATORY RESULT (OUTPATIENT)
Age: 46
End: 2021-03-29

## 2021-04-06 ENCOUNTER — NON-APPOINTMENT (OUTPATIENT)
Age: 46
End: 2021-04-06

## 2021-04-26 ENCOUNTER — OUTPATIENT (OUTPATIENT)
Dept: OUTPATIENT SERVICES | Facility: HOSPITAL | Age: 46
LOS: 1 days | Discharge: HOME | End: 2021-04-26

## 2021-04-26 DIAGNOSIS — Z96.7 PRESENCE OF OTHER BONE AND TENDON IMPLANTS: Chronic | ICD-10-CM

## 2021-04-26 DIAGNOSIS — Z98.890 OTHER SPECIFIED POSTPROCEDURAL STATES: Chronic | ICD-10-CM

## 2021-04-26 DIAGNOSIS — M17.10 UNILATERAL PRIMARY OSTEOARTHRITIS, UNSPECIFIED KNEE: ICD-10-CM

## 2021-04-26 DIAGNOSIS — M54.5 LOW BACK PAIN: ICD-10-CM

## 2021-06-18 ENCOUNTER — APPOINTMENT (OUTPATIENT)
Dept: SURGERY | Facility: CLINIC | Age: 46
End: 2021-06-18
Payer: MEDICARE

## 2021-06-18 PROCEDURE — 99202 OFFICE O/P NEW SF 15 MIN: CPT | Mod: 95

## 2021-06-22 NOTE — ASSESSMENT
[FreeTextEntry1] : Patient currently being evaluated for bariatric surgery.  Pt initially reported interest in the Sleeve Gastrectomy.  Now, the patient wishes to proceed with the Gastric Bypass.  Pt stated that he realizes the he would be better served by undergoing the gastric bypass in respect to his expected weight loss goal.  The surgical options for weight loss have been extensively discussed with the patient and questions answered. The patient was previously provided written and verbal education regarding the Lap Sleeve and LGB. He was encouraged to review the binder.  The patient appears to have a reasonable understanding of the process and is ready to proceed. Risks including but not limited to: anesthesia, death, bleeding, infection, leaks, blood clots, ulcers, malnutrition and need for additional operations have been reviewed.  The importance of a consistent diet and exercise regimen in regards to weight loss and maintenance has also been discussed and the patient agrees to actively participate in this process.The importance of lifelong mineral and vitamin supplementation was also reviewed with the patient. The need to adhere to an appropriate diet and exercise regimen were emphasized; in particular the need to perform moderate to intense physical activity most days of the week. No promises have been made in regards to any given outcome and possibility of inadequate weight loss as well as weight regain has been discussed with the patient. The patient understands that a lifelong commitment is necessary for long-term success.\par \par Given the patient's stated desires and understanding, we will proceed with the preoperative evaluation towards a lap gastric bypass.  All questions were answered' patient was encouraged to contact us should he have additional questions.

## 2021-06-22 NOTE — HISTORY OF PRESENT ILLNESS
[Home] : at home, [unfilled] , at the time of the visit. [Medical Office: ([facility])___] : at the medical office located in  [Verbal consent obtained from patient] : the patient, [unfilled]

## 2021-06-29 ENCOUNTER — NON-APPOINTMENT (OUTPATIENT)
Age: 46
End: 2021-06-29

## 2021-07-14 NOTE — ASU DISCHARGE PLAN (ADULT/PEDIATRIC) - ASU DISCHARGE DATE/TIME
ROS:  GEN: (-) fevers/chills  NECK: (-) stiffness, (-) swelling  RESP: (-) shortness of breath, (-) cough  CV: (+) chest pain, (-) palpitations  GI: (-) nausea, (-) vomiting, (-) pain, (-) constipation, (-) diarrhea  : (-) hematuria, (-) dysuria  EXT: (-) edema   NEURO: (-) weakness, (-) headache, (-) dizziness, (-) syncope  MSK: (-) muscle pain 23-Nov-2020 15:14

## 2021-08-25 ENCOUNTER — APPOINTMENT (OUTPATIENT)
Dept: SURGERY | Facility: CLINIC | Age: 46
End: 2021-08-25
Payer: MEDICARE

## 2021-08-25 VITALS
HEIGHT: 67 IN | HEART RATE: 86 BPM | WEIGHT: 315 LBS | TEMPERATURE: 97.1 F | DIASTOLIC BLOOD PRESSURE: 80 MMHG | BODY MASS INDEX: 49.44 KG/M2 | SYSTOLIC BLOOD PRESSURE: 140 MMHG | OXYGEN SATURATION: 99 %

## 2021-08-25 PROCEDURE — 99213 OFFICE O/P EST LOW 20 MIN: CPT

## 2021-08-25 RX ORDER — TIZANIDINE 4 MG/1
4 TABLET ORAL 3 TIMES DAILY
Refills: 0 | Status: DISCONTINUED | COMMUNITY
End: 2021-08-25

## 2021-08-25 NOTE — HISTORY OF PRESENT ILLNESS
[de-identified] : Jacky has completed his preoperative evaluations as he prepares for gastric bypass.

## 2021-08-25 NOTE — PLAN
[FreeTextEntry1] : We discussed how smoking after gastric bypass can lead to life threatening complications like ulcers.  He has not smoked in several months. He inquired about medical marijuana since his pain management doctor has prescribed it for him and it helps control his chronic pain.  \ajith Will reach out to his pain management physician for a letter stating that is necessary for pain control.\ajith Rico will follow up again for his preoperative consent visit.

## 2021-09-10 RX ORDER — TRAMADOL HYDROCHLORIDE 100 MG/1
100 TABLET, EXTENDED RELEASE ORAL
Refills: 0 | Status: DISCONTINUED | COMMUNITY
End: 2021-09-10

## 2021-09-10 RX ORDER — MELOXICAM 15 MG/1
15 TABLET ORAL
Refills: 0 | Status: DISCONTINUED | COMMUNITY
End: 2021-09-10

## 2021-09-10 RX ORDER — ASPIRIN 81 MG
81 TABLET, DELAYED RELEASE (ENTERIC COATED) ORAL DAILY
Refills: 0 | Status: DISCONTINUED | COMMUNITY
End: 2021-09-10

## 2021-09-13 RX ORDER — OMEPRAZOLE 40 MG/1
40 CAPSULE, DELAYED RELEASE ORAL
Qty: 30 | Refills: 2 | Status: COMPLETED | COMMUNITY
Start: 2021-09-13 | End: 2021-12-20

## 2021-09-15 ENCOUNTER — OUTPATIENT (OUTPATIENT)
Dept: OUTPATIENT SERVICES | Facility: HOSPITAL | Age: 46
LOS: 1 days | Discharge: HOME | End: 2021-09-15
Payer: MEDICARE

## 2021-09-15 ENCOUNTER — RESULT REVIEW (OUTPATIENT)
Age: 46
End: 2021-09-15

## 2021-09-15 ENCOUNTER — APPOINTMENT (OUTPATIENT)
Dept: SURGERY | Facility: CLINIC | Age: 46
End: 2021-09-15
Payer: MEDICARE

## 2021-09-15 VITALS
BODY MASS INDEX: 49.44 KG/M2 | HEIGHT: 67 IN | TEMPERATURE: 97.8 F | DIASTOLIC BLOOD PRESSURE: 86 MMHG | SYSTOLIC BLOOD PRESSURE: 142 MMHG | WEIGHT: 315 LBS | OXYGEN SATURATION: 98 % | HEART RATE: 90 BPM

## 2021-09-15 VITALS
TEMPERATURE: 99 F | DIASTOLIC BLOOD PRESSURE: 87 MMHG | WEIGHT: 315 LBS | HEART RATE: 96 BPM | HEIGHT: 71 IN | RESPIRATION RATE: 16 BRPM | SYSTOLIC BLOOD PRESSURE: 158 MMHG | OXYGEN SATURATION: 97 %

## 2021-09-15 DIAGNOSIS — R06.00 DYSPNEA, UNSPECIFIED: ICD-10-CM

## 2021-09-15 DIAGNOSIS — Z01.818 ENCOUNTER FOR OTHER PREPROCEDURAL EXAMINATION: ICD-10-CM

## 2021-09-15 DIAGNOSIS — Z96.7 PRESENCE OF OTHER BONE AND TENDON IMPLANTS: Chronic | ICD-10-CM

## 2021-09-15 DIAGNOSIS — E66.01 MORBID (SEVERE) OBESITY DUE TO EXCESS CALORIES: ICD-10-CM

## 2021-09-15 DIAGNOSIS — Z98.890 OTHER SPECIFIED POSTPROCEDURAL STATES: Chronic | ICD-10-CM

## 2021-09-15 LAB
A1C WITH ESTIMATED AVERAGE GLUCOSE RESULT: 5.6 % — SIGNIFICANT CHANGE UP (ref 4–5.6)
ALBUMIN SERPL ELPH-MCNC: 4.2 G/DL — SIGNIFICANT CHANGE UP (ref 3.5–5.2)
ALP SERPL-CCNC: 72 U/L — SIGNIFICANT CHANGE UP (ref 30–115)
ALT FLD-CCNC: 13 U/L — SIGNIFICANT CHANGE UP (ref 0–41)
ANION GAP SERPL CALC-SCNC: 13 MMOL/L — SIGNIFICANT CHANGE UP (ref 7–14)
APTT BLD: 33.1 SEC — SIGNIFICANT CHANGE UP (ref 27–39.2)
AST SERPL-CCNC: 13 U/L — SIGNIFICANT CHANGE UP (ref 0–41)
BASOPHILS # BLD AUTO: 0.04 K/UL — SIGNIFICANT CHANGE UP (ref 0–0.2)
BASOPHILS NFR BLD AUTO: 0.4 % — SIGNIFICANT CHANGE UP (ref 0–1)
BILIRUB SERPL-MCNC: <0.2 MG/DL — SIGNIFICANT CHANGE UP (ref 0.2–1.2)
BLD GP AB SCN SERPL QL: SIGNIFICANT CHANGE UP
BUN SERPL-MCNC: 20 MG/DL — SIGNIFICANT CHANGE UP (ref 10–20)
CALCIUM SERPL-MCNC: 9.2 MG/DL — SIGNIFICANT CHANGE UP (ref 8.5–10.1)
CHLORIDE SERPL-SCNC: 101 MMOL/L — SIGNIFICANT CHANGE UP (ref 98–110)
CO2 SERPL-SCNC: 28 MMOL/L — SIGNIFICANT CHANGE UP (ref 17–32)
CREAT SERPL-MCNC: 1 MG/DL — SIGNIFICANT CHANGE UP (ref 0.7–1.5)
EOSINOPHIL # BLD AUTO: 0.28 K/UL — SIGNIFICANT CHANGE UP (ref 0–0.7)
EOSINOPHIL NFR BLD AUTO: 2.6 % — SIGNIFICANT CHANGE UP (ref 0–8)
ESTIMATED AVERAGE GLUCOSE: 114 MG/DL — SIGNIFICANT CHANGE UP (ref 68–114)
GLUCOSE SERPL-MCNC: 101 MG/DL — HIGH (ref 70–99)
HCT VFR BLD CALC: 37.4 % — LOW (ref 42–52)
HGB BLD-MCNC: 11.8 G/DL — LOW (ref 14–18)
IMM GRANULOCYTES NFR BLD AUTO: 0.3 % — SIGNIFICANT CHANGE UP (ref 0.1–0.3)
INR BLD: 1.06 RATIO — SIGNIFICANT CHANGE UP (ref 0.65–1.3)
LYMPHOCYTES # BLD AUTO: 3.38 K/UL — SIGNIFICANT CHANGE UP (ref 1.2–3.4)
LYMPHOCYTES # BLD AUTO: 31.1 % — SIGNIFICANT CHANGE UP (ref 20.5–51.1)
MCHC RBC-ENTMCNC: 26.5 PG — LOW (ref 27–31)
MCHC RBC-ENTMCNC: 31.6 G/DL — LOW (ref 32–37)
MCV RBC AUTO: 83.9 FL — SIGNIFICANT CHANGE UP (ref 80–94)
MONOCYTES # BLD AUTO: 0.55 K/UL — SIGNIFICANT CHANGE UP (ref 0.1–0.6)
MONOCYTES NFR BLD AUTO: 5.1 % — SIGNIFICANT CHANGE UP (ref 1.7–9.3)
NEUTROPHILS # BLD AUTO: 6.58 K/UL — HIGH (ref 1.4–6.5)
NEUTROPHILS NFR BLD AUTO: 60.5 % — SIGNIFICANT CHANGE UP (ref 42.2–75.2)
NRBC # BLD: 0 /100 WBCS — SIGNIFICANT CHANGE UP (ref 0–0)
PLATELET # BLD AUTO: 345 K/UL — SIGNIFICANT CHANGE UP (ref 130–400)
POTASSIUM SERPL-MCNC: 4.4 MMOL/L — SIGNIFICANT CHANGE UP (ref 3.5–5)
POTASSIUM SERPL-SCNC: 4.4 MMOL/L — SIGNIFICANT CHANGE UP (ref 3.5–5)
PROT SERPL-MCNC: 7.5 G/DL — SIGNIFICANT CHANGE UP (ref 6–8)
PROTHROM AB SERPL-ACNC: 12.2 SEC — SIGNIFICANT CHANGE UP (ref 9.95–12.87)
RBC # BLD: 4.46 M/UL — LOW (ref 4.7–6.1)
RBC # FLD: 14.7 % — HIGH (ref 11.5–14.5)
SODIUM SERPL-SCNC: 142 MMOL/L — SIGNIFICANT CHANGE UP (ref 135–146)
WBC # BLD: 10.86 K/UL — HIGH (ref 4.8–10.8)
WBC # FLD AUTO: 10.86 K/UL — HIGH (ref 4.8–10.8)

## 2021-09-15 PROCEDURE — 71046 X-RAY EXAM CHEST 2 VIEWS: CPT | Mod: 26

## 2021-09-15 PROCEDURE — 93010 ELECTROCARDIOGRAM REPORT: CPT

## 2021-09-15 PROCEDURE — 99213 OFFICE O/P EST LOW 20 MIN: CPT

## 2021-09-15 RX ORDER — LOSARTAN POTASSIUM 100 MG/1
1 TABLET, FILM COATED ORAL
Qty: 0 | Refills: 0 | DISCHARGE

## 2021-09-15 RX ORDER — GABAPENTIN 400 MG/1
1 CAPSULE ORAL
Qty: 0 | Refills: 0 | DISCHARGE

## 2021-09-15 NOTE — H&P PST ADULT - NSICDXPASTMEDICALHX_GEN_ALL_CORE_FT
PAST MEDICAL HISTORY:  Arthritis chronic pain RT KNEE    Asthma RARE EPISODIC-LAST USE RESCUE INHALER ABOUT 2YRS AGO    Bilateral carpal tunnel syndrome & nerve damage in bilateral hands    Chronic neck and back pain     COPD (chronic obstructive pulmonary disease)     Fatty liver     HTN (hypertension)     Obesities, morbid     ABI (obstructive sleep apnea)

## 2021-09-15 NOTE — H&P PST ADULT - HISTORY OF PRESENT ILLNESS
Pt states he has been overweight for his whole life. Since 2017 he had a work accident and has not been as active as he would like causing worsening weight gain. Pt states he has tried dieting and other methods but has all been unsuccessful. Now for listed procedure.      Denies any chest pain, difficulty breathing, SOB, palpitations, dysuria, URI, or any other infections in the last 2 weeks. Denies any recent travel, contact, or exposure to any persons with known or suspected COVID-19. Pt also denies COVID testing within the last 2 weeks. Pt admits to receiving all doses of Pfizer COVID vaccine. Denies any suicidal or homicidal ideations. Pt advised to self quarantine until day of procedure. Poor exercise tolerance as pt is unable to climb a flight of stairs without dyspnea mainly d/t bilateral knee pain. ABI reviewed with patient. Pt verbalized understanding of all pre-operative instructions.    Anesthesia Alert  NO--Difficult Airway  NO--History of neck surgery or radiation  YES--Limited ROM of neck  NO--History of Malignant hyperthermia  NO--No personal or family history of Pseudocholinesterase deficiency.  YES--Prior Anesthesia Complication: high blood pressure after surgery (? as per pt)  NO--Latex Allergy  NO--Loose teeth  NO--History of Rheumatoid Arthritis  YES--ABI  NO--Bleeding Risk  NO--Other_____

## 2021-09-15 NOTE — H&P PST ADULT - PRO ARRIVE FROM
How Severe Are Your Spot(S)?: moderate What Is The Reason For Today's Visit?: Skin Lesions What Is The Reason For Today's Visit? (Being Monitored For X): concerning skin lesions on an annual basis home

## 2021-09-17 ENCOUNTER — OUTPATIENT (OUTPATIENT)
Dept: OUTPATIENT SERVICES | Facility: HOSPITAL | Age: 46
LOS: 1 days | Discharge: HOME | End: 2021-09-17

## 2021-09-17 DIAGNOSIS — Z96.7 PRESENCE OF OTHER BONE AND TENDON IMPLANTS: Chronic | ICD-10-CM

## 2021-09-17 DIAGNOSIS — Z98.890 OTHER SPECIFIED POSTPROCEDURAL STATES: Chronic | ICD-10-CM

## 2021-09-17 DIAGNOSIS — Z11.59 ENCOUNTER FOR SCREENING FOR OTHER VIRAL DISEASES: ICD-10-CM

## 2021-09-17 PROBLEM — I10 ESSENTIAL (PRIMARY) HYPERTENSION: Chronic | Status: ACTIVE | Noted: 2021-09-15

## 2021-09-17 PROBLEM — G47.33 OBSTRUCTIVE SLEEP APNEA (ADULT) (PEDIATRIC): Chronic | Status: ACTIVE | Noted: 2021-09-15

## 2021-09-17 PROBLEM — J44.9 CHRONIC OBSTRUCTIVE PULMONARY DISEASE, UNSPECIFIED: Chronic | Status: ACTIVE | Noted: 2021-09-15

## 2021-09-17 PROBLEM — G56.03 CARPAL TUNNEL SYNDROME, BILATERAL UPPER LIMBS: Chronic | Status: ACTIVE | Noted: 2018-10-10

## 2021-09-17 PROBLEM — K76.0 FATTY (CHANGE OF) LIVER, NOT ELSEWHERE CLASSIFIED: Chronic | Status: ACTIVE | Noted: 2021-09-15

## 2021-09-20 ENCOUNTER — APPOINTMENT (OUTPATIENT)
Dept: SURGERY | Facility: HOSPITAL | Age: 46
End: 2021-09-20

## 2021-09-20 ENCOUNTER — INPATIENT (INPATIENT)
Facility: HOSPITAL | Age: 46
LOS: 0 days | Discharge: HOME | End: 2021-09-21
Attending: SURGERY | Admitting: SURGERY
Payer: MEDICARE

## 2021-09-20 VITALS
SYSTOLIC BLOOD PRESSURE: 129 MMHG | WEIGHT: 315 LBS | RESPIRATION RATE: 18 BRPM | OXYGEN SATURATION: 95 % | HEIGHT: 71 IN | TEMPERATURE: 99 F | DIASTOLIC BLOOD PRESSURE: 75 MMHG | HEART RATE: 89 BPM

## 2021-09-20 DIAGNOSIS — Z98.890 OTHER SPECIFIED POSTPROCEDURAL STATES: Chronic | ICD-10-CM

## 2021-09-20 DIAGNOSIS — Z96.7 PRESENCE OF OTHER BONE AND TENDON IMPLANTS: Chronic | ICD-10-CM

## 2021-09-20 LAB
ALBUMIN SERPL ELPH-MCNC: 3.9 G/DL — SIGNIFICANT CHANGE UP (ref 3.5–5.2)
ALP SERPL-CCNC: 62 U/L — SIGNIFICANT CHANGE UP (ref 30–115)
ALT FLD-CCNC: 48 U/L — HIGH (ref 0–41)
ANION GAP SERPL CALC-SCNC: 13 MMOL/L — SIGNIFICANT CHANGE UP (ref 7–14)
AST SERPL-CCNC: 46 U/L — HIGH (ref 0–41)
BILIRUB SERPL-MCNC: 0.2 MG/DL — SIGNIFICANT CHANGE UP (ref 0.2–1.2)
BUN SERPL-MCNC: 21 MG/DL — HIGH (ref 10–20)
CALCIUM SERPL-MCNC: 8.7 MG/DL — SIGNIFICANT CHANGE UP (ref 8.5–10.1)
CHLORIDE SERPL-SCNC: 102 MMOL/L — SIGNIFICANT CHANGE UP (ref 98–110)
CK MB CFR SERPL CALC: 2.4 NG/ML — SIGNIFICANT CHANGE UP (ref 0.6–6.3)
CK SERPL-CCNC: 69 U/L — SIGNIFICANT CHANGE UP (ref 0–225)
CO2 SERPL-SCNC: 25 MMOL/L — SIGNIFICANT CHANGE UP (ref 17–32)
CREAT SERPL-MCNC: 1.2 MG/DL — SIGNIFICANT CHANGE UP (ref 0.7–1.5)
GLUCOSE BLDC GLUCOMTR-MCNC: 100 MG/DL — HIGH (ref 70–99)
GLUCOSE BLDC GLUCOMTR-MCNC: 120 MG/DL — HIGH (ref 70–99)
GLUCOSE SERPL-MCNC: 128 MG/DL — HIGH (ref 70–99)
HCT VFR BLD CALC: 33.8 % — LOW (ref 42–52)
HCT VFR BLD CALC: 36 % — LOW (ref 42–52)
HGB BLD-MCNC: 11 G/DL — LOW (ref 14–18)
HGB BLD-MCNC: 11.5 G/DL — LOW (ref 14–18)
MAGNESIUM SERPL-MCNC: 1.7 MG/DL — LOW (ref 1.8–2.4)
MCHC RBC-ENTMCNC: 26.4 PG — LOW (ref 27–31)
MCHC RBC-ENTMCNC: 26.8 PG — LOW (ref 27–31)
MCHC RBC-ENTMCNC: 31.9 G/DL — LOW (ref 32–37)
MCHC RBC-ENTMCNC: 32.5 G/DL — SIGNIFICANT CHANGE UP (ref 32–37)
MCV RBC AUTO: 82.2 FL — SIGNIFICANT CHANGE UP (ref 80–94)
MCV RBC AUTO: 82.8 FL — SIGNIFICANT CHANGE UP (ref 80–94)
NRBC # BLD: 0 /100 WBCS — SIGNIFICANT CHANGE UP (ref 0–0)
NRBC # BLD: 0 /100 WBCS — SIGNIFICANT CHANGE UP (ref 0–0)
PHOSPHATE SERPL-MCNC: 4 MG/DL — SIGNIFICANT CHANGE UP (ref 2.1–4.9)
PLATELET # BLD AUTO: 298 K/UL — SIGNIFICANT CHANGE UP (ref 130–400)
PLATELET # BLD AUTO: 308 K/UL — SIGNIFICANT CHANGE UP (ref 130–400)
POTASSIUM SERPL-MCNC: 4.6 MMOL/L — SIGNIFICANT CHANGE UP (ref 3.5–5)
POTASSIUM SERPL-SCNC: 4.6 MMOL/L — SIGNIFICANT CHANGE UP (ref 3.5–5)
PROT SERPL-MCNC: 6.9 G/DL — SIGNIFICANT CHANGE UP (ref 6–8)
RBC # BLD: 4.11 M/UL — LOW (ref 4.7–6.1)
RBC # BLD: 4.35 M/UL — LOW (ref 4.7–6.1)
RBC # FLD: 14.6 % — HIGH (ref 11.5–14.5)
RBC # FLD: 14.7 % — HIGH (ref 11.5–14.5)
SODIUM SERPL-SCNC: 140 MMOL/L — SIGNIFICANT CHANGE UP (ref 135–146)
TROPONIN T SERPL-MCNC: <0.01 NG/ML — SIGNIFICANT CHANGE UP
WBC # BLD: 16.71 K/UL — HIGH (ref 4.8–10.8)
WBC # BLD: 17.85 K/UL — HIGH (ref 4.8–10.8)
WBC # FLD AUTO: 16.71 K/UL — HIGH (ref 4.8–10.8)
WBC # FLD AUTO: 17.85 K/UL — HIGH (ref 4.8–10.8)

## 2021-09-20 PROCEDURE — 93010 ELECTROCARDIOGRAM REPORT: CPT

## 2021-09-20 PROCEDURE — 43644 LAP GASTRIC BYPASS/ROUX-EN-Y: CPT

## 2021-09-20 RX ORDER — ENOXAPARIN SODIUM 100 MG/ML
40 INJECTION SUBCUTANEOUS ONCE
Refills: 0 | Status: COMPLETED | OUTPATIENT
Start: 2021-09-20 | End: 2021-09-20

## 2021-09-20 RX ORDER — MEPERIDINE HYDROCHLORIDE 50 MG/ML
12.5 INJECTION INTRAMUSCULAR; INTRAVENOUS; SUBCUTANEOUS
Refills: 0 | Status: DISCONTINUED | OUTPATIENT
Start: 2021-09-20 | End: 2021-09-20

## 2021-09-20 RX ORDER — KETOROLAC TROMETHAMINE 30 MG/ML
15 SYRINGE (ML) INJECTION EVERY 8 HOURS
Refills: 0 | Status: DISCONTINUED | OUTPATIENT
Start: 2021-09-20 | End: 2021-09-21

## 2021-09-20 RX ORDER — SODIUM CHLORIDE 9 MG/ML
1000 INJECTION, SOLUTION INTRAVENOUS
Refills: 0 | Status: DISCONTINUED | OUTPATIENT
Start: 2021-09-20 | End: 2021-09-20

## 2021-09-20 RX ORDER — ACETAMINOPHEN 500 MG
1000 TABLET ORAL ONCE
Refills: 0 | Status: COMPLETED | OUTPATIENT
Start: 2021-09-21 | End: 2021-09-21

## 2021-09-20 RX ORDER — MAGNESIUM SULFATE 500 MG/ML
2 VIAL (ML) INJECTION ONCE
Refills: 0 | Status: COMPLETED | OUTPATIENT
Start: 2021-09-20 | End: 2021-09-20

## 2021-09-20 RX ORDER — ACETAMINOPHEN 500 MG
1000 TABLET ORAL ONCE
Refills: 0 | Status: DISCONTINUED | OUTPATIENT
Start: 2021-09-20 | End: 2021-09-21

## 2021-09-20 RX ORDER — GABAPENTIN 400 MG/1
800 CAPSULE ORAL
Refills: 0 | Status: DISCONTINUED | OUTPATIENT
Start: 2021-09-20 | End: 2021-09-21

## 2021-09-20 RX ORDER — PANTOPRAZOLE SODIUM 20 MG/1
40 TABLET, DELAYED RELEASE ORAL DAILY
Refills: 0 | Status: DISCONTINUED | OUTPATIENT
Start: 2021-09-21 | End: 2021-09-21

## 2021-09-20 RX ORDER — HYDROMORPHONE HYDROCHLORIDE 2 MG/ML
1 INJECTION INTRAMUSCULAR; INTRAVENOUS; SUBCUTANEOUS
Refills: 0 | Status: DISCONTINUED | OUTPATIENT
Start: 2021-09-20 | End: 2021-09-20

## 2021-09-20 RX ORDER — ACETAMINOPHEN 500 MG
1000 TABLET ORAL ONCE
Refills: 0 | Status: DISCONTINUED | OUTPATIENT
Start: 2021-09-20 | End: 2021-09-20

## 2021-09-20 RX ORDER — BUPIVACAINE 13.3 MG/ML
20 INJECTION, SUSPENSION, LIPOSOMAL INFILTRATION ONCE
Refills: 0 | Status: DISCONTINUED | OUTPATIENT
Start: 2021-09-20 | End: 2021-09-20

## 2021-09-20 RX ORDER — ACETAMINOPHEN 500 MG
1000 TABLET ORAL ONCE
Refills: 0 | Status: COMPLETED | OUTPATIENT
Start: 2021-09-20 | End: 2021-09-20

## 2021-09-20 RX ORDER — ENOXAPARIN SODIUM 100 MG/ML
40 INJECTION SUBCUTANEOUS EVERY 12 HOURS
Refills: 0 | Status: DISCONTINUED | OUTPATIENT
Start: 2021-09-20 | End: 2021-09-21

## 2021-09-20 RX ORDER — ONDANSETRON 8 MG/1
4 TABLET, FILM COATED ORAL EVERY 6 HOURS
Refills: 0 | Status: DISCONTINUED | OUTPATIENT
Start: 2021-09-20 | End: 2021-09-20

## 2021-09-20 RX ORDER — SODIUM CHLORIDE 9 MG/ML
1000 INJECTION, SOLUTION INTRAVENOUS
Refills: 0 | Status: DISCONTINUED | OUTPATIENT
Start: 2021-09-20 | End: 2021-09-21

## 2021-09-20 RX ORDER — ONDANSETRON 8 MG/1
4 TABLET, FILM COATED ORAL ONCE
Refills: 0 | Status: DISCONTINUED | OUTPATIENT
Start: 2021-09-20 | End: 2021-09-21

## 2021-09-20 RX ORDER — HYDROMORPHONE HYDROCHLORIDE 2 MG/ML
0.5 INJECTION INTRAMUSCULAR; INTRAVENOUS; SUBCUTANEOUS
Refills: 0 | Status: DISCONTINUED | OUTPATIENT
Start: 2021-09-20 | End: 2021-09-21

## 2021-09-20 RX ORDER — LOSARTAN POTASSIUM 100 MG/1
100 TABLET, FILM COATED ORAL DAILY
Refills: 0 | Status: DISCONTINUED | OUTPATIENT
Start: 2021-09-20 | End: 2021-09-21

## 2021-09-20 RX ORDER — FAMOTIDINE 10 MG/ML
20 INJECTION INTRAVENOUS ONCE
Refills: 0 | Status: COMPLETED | OUTPATIENT
Start: 2021-09-20 | End: 2021-09-20

## 2021-09-20 RX ORDER — TRAMADOL HYDROCHLORIDE 50 MG/1
50 TABLET ORAL EVERY 4 HOURS
Refills: 0 | Status: DISCONTINUED | OUTPATIENT
Start: 2021-09-20 | End: 2021-09-21

## 2021-09-20 RX ADMIN — Medication 50 GRAM(S): at 16:09

## 2021-09-20 RX ADMIN — GABAPENTIN 800 MILLIGRAM(S): 400 CAPSULE ORAL at 23:35

## 2021-09-20 RX ADMIN — LOSARTAN POTASSIUM 100 MILLIGRAM(S): 100 TABLET, FILM COATED ORAL at 16:56

## 2021-09-20 RX ADMIN — Medication 15 MILLIGRAM(S): at 17:00

## 2021-09-20 RX ADMIN — GABAPENTIN 800 MILLIGRAM(S): 400 CAPSULE ORAL at 18:47

## 2021-09-20 RX ADMIN — Medication 400 MILLIGRAM(S): at 21:21

## 2021-09-20 RX ADMIN — ENOXAPARIN SODIUM 40 MILLIGRAM(S): 100 INJECTION SUBCUTANEOUS at 07:33

## 2021-09-20 RX ADMIN — ENOXAPARIN SODIUM 40 MILLIGRAM(S): 100 INJECTION SUBCUTANEOUS at 19:01

## 2021-09-20 RX ADMIN — FAMOTIDINE 104 MILLIGRAM(S): 10 INJECTION INTRAVENOUS at 07:33

## 2021-09-20 RX ADMIN — HYDROMORPHONE HYDROCHLORIDE 1 MILLIGRAM(S): 2 INJECTION INTRAMUSCULAR; INTRAVENOUS; SUBCUTANEOUS at 13:44

## 2021-09-20 NOTE — ASU PATIENT PROFILE, ADULT - VISION (WITH CORRECTIVE LENSES IF THE PATIENT USUALLY WEARS THEM):
glassses in [place] E/Normal vision: sees adequately in most situations; can see medication labels, newsprint

## 2021-09-20 NOTE — CONSULT NOTE ADULT - ASSESSMENT
Assessment & Plan    46y Male with pmhx of HTN, COPD/asthma, ABI, chronic pain from work accident, s/p laparoscopic gastric bypass, bilateral TAP block, and intra-op endoscopy, POD#0. SICU team consulted to monitor hemodynamic stability.    NEURO:  AAOx3    Acute pain-controlled with tylenol, tramadol, gabapentin, dilaudid PRN    Home Rx:gabapentin 800 mg oral tablet  traMADol 50 mg oral tablet  Tylenol 325 mg oral tablet      RESP:   ABI on CPAP (patient brought own)    COPD/asthma- not taking any meds  Oxygenation-wean off NC to RA as tolerated    CARDS:   HTN: on losartan 100mg daily    per primary team, restart home medication tonight  f/u post-op EKG    GI/NUTR:     Diet, Clear Liquid    GI Prophylaxis- PPI    Bowel regimen- none    /RENAL:        Monitor UO: TOV by 1930       LR 125cc/hr       f/u post-op labs    HEME/ONC:       DVT prophylaxis-enoxaparin Injectable 40mg BID- to start tonight @1900  , SCDs    f/u post-op labs      ID:  afebrile  Temp trend- 24hrs T(F): 98.7 (09-20 @ 13:05), Max: 99.3 (09-20 @ 07:23)  Antibiotics- cefotetan 3g intra-op  f/u post-op labs    ENDO:    FS  q6hrs     HA1C: 5.6%    LINES/DRAINS:  PIV     Advanced Directives: Presumed Full Code    HCP/Emergency Contact-    DISPO:    SICU, discussed with Dr. Ferrera Assessment & Plan    46y Male with pmhx of HTN, COPD/asthma, ABI, chronic pain from work accident, s/p laparoscopic gastric bypass, bilateral TAP block, and intra-op endoscopy, POD#0. SICU team consulted to monitor hemodynamic stability.    NEURO:  AAOx3    Acute pain-controlled with tylenol, tramadol, gabapentin, dilaudid, toradol PRN    Home Rx:gabapentin 800 mg oral tablet  traMADol 50 mg oral tablet  Tylenol 325 mg oral tablet      RESP:   ABI on CPAP (patient brought own)    COPD/asthma- not taking any meds  Oxygenation-wean off NC to RA as tolerated    CARDS:   HTN: on losartan 100mg daily    per primary team, restart home medication tonight  f/u post-op EKG    GI/NUTR:     Diet, Clear Liquid    GI Prophylaxis- PPI    Bowel regimen- none    /RENAL:        Monitor UO: TOV by 1930       LR 125cc/hr       f/u post-op labs    HEME/ONC:       DVT prophylaxis-enoxaparin Injectable 40mg BID- to start tonight @1900  , SCDs    f/u post-op labs      ID:  afebrile  Temp trend- 24hrs T(F): 98.7 (09-20 @ 13:05), Max: 99.3 (09-20 @ 07:23)  Antibiotics- cefotetan 3g intra-op  f/u post-op labs    ENDO:    FS  q6hrs     HA1C: 5.6%    LINES/DRAINS:  PIV     Advanced Directives: Presumed Full Code    HCP/Emergency Contact-    DISPO:    SICU, discussed with Dr. Ferrera Assessment & Plan    46y Male with pmhx of HTN, COPD/asthma, ABI, chronic pain from work accident, s/p laparoscopic gastric bypass, bilateral TAP block, and intra-op endoscopy, POD#0. SICU team consulted to monitor hemodynamic stability.    NEURO:  AAOx3    Acute pain-controlled with tylenol, tramadol, gabapentin, dilaudid, toradol PRN    Home Rx:gabapentin 800 mg oral tablet  traMADol 50 mg oral tablet  Tylenol 325 mg oral tablet      RESP:   ABI on CPAP (patient brought own)    COPD/asthma- not taking any meds  Oxygenation-wean off NC to RA as tolerated    CARDS:   HTN: on losartan 100mg daily    per primary team, restart home medication tonight  f/u post-op EKG    GI/NUTR:   h/o fatty liver    Diet, Clear Liquid    GI Prophylaxis- PPI    Bowel regimen- none    /RENAL:        Monitor UO: TOV by 1930       LR 125cc/hr       f/u post-op labs    HEME/ONC:       DVT prophylaxis-enoxaparin Injectable 40mg BID- to start tonight @1900  , SCDs    f/u post-op labs      ID:  afebrile  Temp trend- 24hrs T(F): 98.7 (09-20 @ 13:05), Max: 99.3 (09-20 @ 07:23)  Antibiotics- cefotetan 3g intra-op  f/u post-op labs    ENDO:    FS  q6hrs     HA1C: 5.6%    LINES/DRAINS:  PIV     Advanced Directives: Presumed Full Code    HCP/Emergency Contact-    DISPO:    SICU, discussed with Dr. Ferrera

## 2021-09-20 NOTE — BRIEF OPERATIVE NOTE - NSICDXBRIEFPROCEDURE_GEN_ALL_CORE_FT
PROCEDURES:  Laparoscopic gastric bypass 20-Sep-2021 13:02:35  Chantal Naik  Upper endoscopy 20-Sep-2021 13:02:44  Chantal Naik

## 2021-09-20 NOTE — CONSULT NOTE ADULT - SUBJECTIVE AND OBJECTIVE BOX
DARIONRIGOBERTO ROLLE     46y Male    MRN-[id number]    Admit Date: 09-20-21  Hospital LOS:   ICU Admission Date: 9/20/21  OR #1- 9/20/21        ============================   HPI   HPI: 47 y/o M, with pmhx of HTN, COPD/asthma, ABI, chronic pain from work accident, s/p laparoscopic gastric bypass, bilateral TAP block, and intra-op endoscopy, POD#0. SICU team consulted to monitor hemodynamic stability.    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  COPD (chronic obstructive pulmonary disease)  Fatty liver  HTN (hypertension)  ABI (obstructive sleep apnea)  S/P ORIF (open reduction internal fixation) fracture  LT ANKLE  H/O arthroscopy of knee  RT -2017 -SPINAL  History of carpal tunnel release of both wrists  06/2018 RIght    Home Meds:  Home Medications:  gabapentin 800 mg oral tablet: 1 tab(s) orally 4 times a day (20 Sep 2021 07:17)  losartan 100 mg oral tablet: 1 tab(s) orally once a day (at bedtime) (20 Sep 2021 07:17)  traMADol 50 mg oral tablet: 1 tab(s) orally every 4 hours (20 Sep 2021 07:17)  Tylenol 325 mg oral tablet: 2 tab(s) orally every 4 hours, As Needed (20 Sep 2021 07:17)       Allergies  Allergies  No Known Allergies     24 Hour Events  -Admission under SICU service        [X] A ten-point review of systems was otherwise negative except as noted above.  [  ] Due to altered mental status/intubation, subjective information was not attained from the patient. History was obtained, to the extent possible, from review of the chart and collateral sources of information.    ==========================    VITAL SIGNS, INS/OUTS (Last 24hours):    ICU Vital Signs Last 24 Hrs  T(C): 37.1 (20 Sep 2021 13:05), Max: 37.4 (20 Sep 2021 07:23)  T(F): 98.7 (20 Sep 2021 13:05), Max: 99.3 (20 Sep 2021 07:23)  HR: 84 (20 Sep 2021 13:30) (84 - 92)  BP: 157/84 (20 Sep 2021 13:30) (129/75 - 168/87)  RR: 14 (20 Sep 2021 13:30) (13 - 18)  SpO2: 93% (20 Sep 2021 13:30) (93% - 95%)    I&O's Summary          Physical Exam:   ----------------------------------------------------------------------------------------------------------  GCS:  15    Exam: A&Ox3, no focal deficits    RESPIRATORY: 2L NC  Normal expansion/effort  CTAB    CARDIOVASCULAR:   S1/S2.  RRR  No peripheral edema    GASTROINTESTINAL:  Abdomen soft, non-tender, non-distended, 6 laparoscopic incisions, covered with dermabond    MUSCULOSKELETAL:  Extremities warm, pink, well-perfused.    DERM:  No skin breakdown     :   Exam: TOV by 1930.     Height (cm): 180.3 (09-20-21)  Weight (kg): 190.5 (09-20-21)  BMI (kg/m2): 58.6 (09-20-21)  BSA (m2): 2.89 (09-20-21)    Tubes/Lines/Drains   ----------------------------------------------------------------------------------------------------------  [x] Peripheral IV      LABS  ----------------------------------------------------------------------------------------------------------       SHAKEELMASCRIGOBERTO ROLLE     46y Male    MRN-[id number]    Admit Date: 09-20-21  Hospital LOS:   ICU Admission Date: 9/20/21  OR #1- 9/20/21        ============================   HPI   HPI: 45 y/o M, with pmhx of HTN, COPD/asthma, ABI, fatty liver, chronic pain from work accident, s/p laparoscopic gastric bypass, bilateral TAP block, and intra-op endoscopy, POD#0. SICU team consulted to monitor hemodynamic stability.    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  COPD (chronic obstructive pulmonary disease)  Fatty liver  HTN (hypertension)  ABI (obstructive sleep apnea)  S/P ORIF (open reduction internal fixation) fracture  LT ANKLE  H/O arthroscopy of knee  RT -2017 -SPINAL  History of carpal tunnel release of both wrists  06/2018 RIght    Home Meds:  Home Medications:  gabapentin 800 mg oral tablet: 1 tab(s) orally 4 times a day (20 Sep 2021 07:17)  losartan 100 mg oral tablet: 1 tab(s) orally once a day (at bedtime) (20 Sep 2021 07:17)  traMADol 50 mg oral tablet: 1 tab(s) orally every 4 hours (20 Sep 2021 07:17)  Tylenol 325 mg oral tablet: 2 tab(s) orally every 4 hours, As Needed (20 Sep 2021 07:17)       Allergies  Allergies  No Known Allergies     24 Hour Events  -Admission under SICU service        [X] A ten-point review of systems was otherwise negative except as noted above.  [  ] Due to altered mental status/intubation, subjective information was not attained from the patient. History was obtained, to the extent possible, from review of the chart and collateral sources of information.    ==========================    VITAL SIGNS, INS/OUTS (Last 24hours):    ICU Vital Signs Last 24 Hrs  T(C): 37.1 (20 Sep 2021 13:05), Max: 37.4 (20 Sep 2021 07:23)  T(F): 98.7 (20 Sep 2021 13:05), Max: 99.3 (20 Sep 2021 07:23)  HR: 84 (20 Sep 2021 13:30) (84 - 92)  BP: 157/84 (20 Sep 2021 13:30) (129/75 - 168/87)  RR: 14 (20 Sep 2021 13:30) (13 - 18)  SpO2: 93% (20 Sep 2021 13:30) (93% - 95%)    I&O's Summary          Physical Exam:   ----------------------------------------------------------------------------------------------------------  GCS:  15    Exam: A&Ox3, no focal deficits    RESPIRATORY: 2L NC  Normal expansion/effort  CTAB    CARDIOVASCULAR:   S1/S2.  RRR  No peripheral edema    GASTROINTESTINAL:  Abdomen soft, non-tender, non-distended, 6 laparoscopic incisions, covered with dermabond    MUSCULOSKELETAL:  Extremities warm, pink, well-perfused.    DERM:  No skin breakdown     :   Exam: TOV by 1930.     Height (cm): 180.3 (09-20-21)  Weight (kg): 190.5 (09-20-21)  BMI (kg/m2): 58.6 (09-20-21)  BSA (m2): 2.89 (09-20-21)    Tubes/Lines/Drains   ----------------------------------------------------------------------------------------------------------  [x] Peripheral IV      LABS  ----------------------------------------------------------------------------------------------------------

## 2021-09-20 NOTE — BRIEF OPERATIVE NOTE - OPERATION/FINDINGS
History of Present Illness:        Ms. Young is a 72 year-old female with a history of endometrial cancer s/p HEATHER/BSO and XRT and bladder cancer s/p TURBT, who I last saw in the office on 10/28/20.  She returns today in routine GI follow-up.    -- Last seen in the office on 10/28/20, at which time her GERD remained well suppressed on omeprazole 40 mg Qday (had not required Gaviscon in months).  Additionally, we had switched her from dicyclomine to hyoscyamine to address her episodic epigastric and periumbilical cramping, and this wa working reasonably well, specifically relieving the tightness in her upper abdomen (was averaging its use 3-4 times per week).  One significant concern that was more prominent than baseline were diarrheal flares.  She was experiencing 3-4 watery stools over 90 minutes and using Imodium afterwards -- in between the flares, which were happening 2-3 times per week, she was having 1-2 \"normal\" stools daily.  We attempted to address this by promoting compliance with her cholestyramine and recommended that she use it twice daily.    -- She notified us on 01/11/21 of worsening upper abdominal cramping.  As this was more focused to the upper abdomen, we increased the omeprazole from 40 mg Qday to 40 mg BID, and she actually had a very good week.  -- This was followed by a \"bad week\" -- ie, diarrhea, bloating, and abdominal cramping.  She stopped taking the hyoscyamine because she thought it could have been contributing.  -- Since then, she does not really have the abdominal cramping as much -- still has this 1-2 times/day but only lasts about 30 minutes (whereas before, it could last the whole day).  Localized to upper abdomen for the most part and worse after eating.  Thinks the increased PPI dose has specifically helped with this.  -- No associated nausea/vomiting.  -- Associated with significant belching but no heartburn or indigestion.  -- Also with more bloating than usual.    -- She is  having \"normal\" BMs more days than not (ie 1-2/day).  -- Has \"diarrhea days\" 2 times per week -- usually in the AM and will have 3-4 episodes.  -- Diarrhea seems unprovoked -- has not been able to identify specific dietary triggers.  -- No blood in stool.  -- Uses Imodium as needed.    -- Has lost 25 pounds in the past year -- she attributes this to her GI symptoms and not being able to eat as much as she would like.  -- She has been very anxious through the COVID-19 pandemic and admits that this anxiety could be playing a role with her GI symptoms and weight loss.         Review of Systems:   GI review of symptoms as in HPI.  All other systems reviewed and are negative.     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    Social History:  Alcohol:  Denies  Smoking:  Quit in her mid-50s.  Illicits:  Denies    Physical Examination:   Vitals -- Wt 128 lbs     BMI 23.4  General -- Well-nourished female in no apparent distress, accompanied by .  Skin -- no pallor, no jaundice, no rashes  HEENT -- moist mucous membranes, pink conjunctivae, anicteric sclerae, no oral lesions  Neck -- supple, no LAD  Chest -- no sternal tenderness  CV -- RRR, no murmurs  Lungs -- CTA B, good aeration  Abdomen -- soft, mild tenderness without rebound or guarding across lower abdomen, non-distended, active bowel sounds, liver edge and spleen tip not palpable  Extremities -- no edema or cyanosis,  well-perfused  Psych -- A&Ox3, normal affect  Neuro -- non-focal    Labs:   None available.    Tests/Imaging:  KUB (06/19) -- normal    Colonoscopy (01/18), per Dr. Ortiz -- strictured sigmoid colon and rectosigmoid with friability (likely XRT-induced) affixed to pelvis only traversable with pediatric colonoscope, mild left-sided diverticulosis, small internal hemorrhoids    EGD (01/18), per Dr. Ortiz -- multiple proximal duodenal erosions, diffuse distal gastritis, mild GEJ inflammation    Assessment/Plan:   Ms. Young is a 72 year-old female with endometrial cancer s/p HEATHER/BSO and XRT and bladder cancer s/p TURBT.  She has chronic issues with diarrhea and GERD, along with intermittent abdominal pain/nausea/vomiting attributable to intra-abdominal adhesions and transient SBOs.    1)  GERD -- Was stared on omeprazole 40 mg Qday in late-2019 due to GERD that was breaking through Gaviscon PRN.  GERD control on standard-dose PPI has been excellent.  PPI was doubled more due to upper abdominal pain (? dyspepsia component) rather than sub-optimal acid reflux control.  No Jarvis's, corrosive injury, or hiatal hernia at 01/18 EGD.  -- From the GERD perspective, PPI dosing could be omeprazole 40 mg Qday or BID (30-60 minutes before meals).  -- Has not required Gaviscon, but it can be used PRN if necessary.   -- GERD diet reviewed.  -- If an EGD is required due to other symptoms (see below), then will screen for Jarvis's -- otherwise, given her robust GERD control and bland findings at last EGD, another one would not be indicated for the sole purpose of Javris's screening.  -- We discussed the possibility of calcium malabsorption and resultant bone loss in women, particular those who are post-menopausal, on chronic PPI therapy.  At the very least, she should be on calcium citrate 1200 mg Qday supplementation, and her PCP can determine the appropriateness of vitamin D and bone density testing.    2)  Episodic abdominal  pain/nausea/vomiting -- Has had this since her HEATHER/BSO and XRT for endometrial cancer.  Each episode is predictable and short-lived.  The presentation is consistent with transient SBOs brought upon by her presumed intra-abdominal adhesions.  The adhesions were suggested at colonoscopy.  Has never been admitted for a prolonged SBO episode.  -- When she gets one of these flares, she either becomes NPO or subsists on clears until the symptoms resolve.  She knows to go to the ED if the symptoms persist longer than 24 hours.  -- She has limited her dietary fiber and drinks plenty of water.  Powdered fiber supplementation should be avoided.    3)  Epigastric cramping, bloating, and diarrhea -- These symptoms are different from the symptoms related to adhesions and transient SBOs as noted above and have been more prominent recently  There has been associated weight loss as well.  There was some improvement after the PPI dose was doubled, raising the possibility of acid dyspepsia (unlikely to have PUD since she has been on PPI therapy).  It is also likely that stress/anxiety is playing a role by introducing functional elements to her chronic GI issues.  Historically, we have also proposed a combination of IBS-D and post-CCY cholerheic enteropathy.  Speaking to this is the improvement she has had with anti-spasmodics + cholestyramine.  The lack of persistence of symptoms speaks against IBD, microscopic colitis, eosinophilic gastroenteritis, celiac disease, pancreatic concerns, etc.  This is also inconsistent with pancreatitis and is unlikely to represent pancreatic insufficiency and SIBO (no risk factors for the latter).  Post-XRT radiation enterocolitis (this was suggested by colonoscopy) could be considered, with even XRT strictures being plausible.  -- Baseline labs -- CBC, CMP, TSH, and lipase.  -- Due to weight loss, will proceed with CT abdomen/pelvis with IV contrast.  -- If labs and CT are unrevealing, then will  proceed with EGD.  -- Has not been using hyoscyamine 0.125 mg PRN but could certainly return to this for management of abdominal cramping.  Per usual, she was instructed to this as soon as the discomfort begins, which could then break the \"cycle\" of the episode and prevent the diarrheal phase.  -- Continue cholestyramine 4 g Qday -- BID dosing results in constipation, but I suggested she use it BID when she is in the midst of a flare.  -- Imodium PRN is available to her.  -- If ongoing issues, could consider sigmoidoscopy to assess for inflammation (overt and microscopic and distal colonic obstructive lesion) and MR enterograpy to assess for XRT strictures.  -- Ultimately, a rifaximin trial could be considered for ongoing symptomology despite above measures.    Follow-up to be determined by clinical course.      Electronically signed by: Giorgi Jauregui MD     2/23/2021       lap gastric bypass with bilateral tap block, intraop endoscopy

## 2021-09-20 NOTE — CHART NOTE - NSCHARTNOTEFT_GEN_A_CORE
Post Operative Note  Patient: RIGOBERTO LAKE 46y (1975) Male   MRN: [id number]  [facility]  Visit: 09-20-21 Inpatient  Date: 09-20-21 @ 17:18    Procedure: Obesity, morbid     S/P Laparoscopic gastric bypass    Upper endoscopy        Subjective: Patient was hypertensive post-operatively, but now is down to 150/86 with a HR of 90. Patient is doing well having tolerated 1 oz twice without nausea or vomiting. Patient does have some pain around his right midclavicular incision which is appropriate. Denies any distension, SOB, chest pain, lightheadedness, headache, vision changes, weakness, or paresthesias. Patient has been ambulating and voiding without difficulty. Has yet to pass gas or have a bowel movement.       Objective:  Vitals: T(F): 98.7 (09-20-21 @ 13:05), Max: 99.3 (09-20-21 @ 07:23)  HR: 91 (09-20-21 @ 16:00)  BP: 160/89 (09-20-21 @ 16:00) (129/75 - 175/78)  RR: 17 (09-20-21 @ 16:00)  SpO2: 96% (09-20-21 @ 16:00)  Vent Settings:     In:   09-20-21 @ 07:01  -  09-20-21 @ 17:18  --------------------------------------------------------  IN: 675 mL      IV Fluids: lactated ringers. 1000 milliLiter(s) (125 mL/Hr) IV Continuous <Continuous>      Out:   09-20-21 @ 07:01  -  09-20-21 @ 17:18  --------------------------------------------------------  OUT: 400 mL      EBL:     Voided Urine:   09-20-21 @ 07:01  -  09-20-21 @ 17:18  --------------------------------------------------------  OUT: 400 mL      Suggs Catheter: yes no   Drains:   CLARA:    ,   Chest Tube:      NG Tube:       Physical Examination:  General Appearance: NAD  HEENT: EOMI, sclera non-icteric.  Heart: RRR  Lungs: CTABL  Abdomen:  mildly distended, tender, but appropriate for post-op course. No rigidity, guarding, or rebound tenderness.   MSK/Extremities: Warm & well-perfused. Peripheral pulses intact.  Skin: Warm, dry. No jaundice.   Incisions: No induration, erythema, or drainage    Medications: [Standing]  acetaminophen  IVPB .. 1000 milliGRAM(s) IV Intermittent once  acetaminophen  IVPB .. 1000 milliGRAM(s) IV Intermittent once PRN  enoxaparin Injectable 40 milliGRAM(s) SubCutaneous every 12 hours  gabapentin Solution 800 milliGRAM(s) Oral four times a day  HYDROmorphone  Injectable 0.5 milliGRAM(s) IV Push every 10 minutes PRN  HYDROmorphone  Injectable 1 milliGRAM(s) IV Push every 10 minutes PRN  ketorolac   Injectable 15 milliGRAM(s) IV Push every 8 hours PRN  lactated ringers. 1000 milliLiter(s) IV Continuous <Continuous>  losartan 100 milliGRAM(s) Oral daily  meperidine     Injectable 12.5 milliGRAM(s) IV Push every 10 minutes PRN  ondansetron Injectable 4 milliGRAM(s) IV Push every 6 hours PRN  ondansetron Injectable 4 milliGRAM(s) IV Push once PRN  traMADol 50 milliGRAM(s) Oral every 4 hours PRN    Medications: [PRN]  acetaminophen  IVPB .. 1000 milliGRAM(s) IV Intermittent once  acetaminophen  IVPB .. 1000 milliGRAM(s) IV Intermittent once PRN  enoxaparin Injectable 40 milliGRAM(s) SubCutaneous every 12 hours  gabapentin Solution 800 milliGRAM(s) Oral four times a day  HYDROmorphone  Injectable 0.5 milliGRAM(s) IV Push every 10 minutes PRN  HYDROmorphone  Injectable 1 milliGRAM(s) IV Push every 10 minutes PRN  ketorolac   Injectable 15 milliGRAM(s) IV Push every 8 hours PRN  lactated ringers. 1000 milliLiter(s) IV Continuous <Continuous>  losartan 100 milliGRAM(s) Oral daily  meperidine     Injectable 12.5 milliGRAM(s) IV Push every 10 minutes PRN  ondansetron Injectable 4 milliGRAM(s) IV Push every 6 hours PRN  ondansetron Injectable 4 milliGRAM(s) IV Push once PRN  traMADol 50 milliGRAM(s) Oral every 4 hours PRN      DVT PROPHYLAXIS: enoxaparin Injectable 40 milliGRAM(s) SubCutaneous every 12 hours    GI PROPHYLAXIS:   ANTICOAGULATION:   ANTIBIOTICS:        Labs:                        11.5   17.85 )-----------( 308      ( 20 Sep 2021 14:22 )             36.0     09-20    140  |  102  |  21<H>  ----------------------------<  128<H>  4.6   |  25  |  1.2    Ca    8.7      20 Sep 2021 14:22  Phos  4.0     09-20  Mg     1.7     09-20    TPro  6.9  /  Alb  3.9  /  TBili  0.2  /  DBili  x   /  AST  46<H>  /  ALT  48<H>  /  AlkPhos  62  09-20      CARDIAC MARKERS ( 20 Sep 2021 14:45 )  x     / <0.01 ng/mL / x     / x     / 2.4 ng/mL  CARDIAC MARKERS ( 20 Sep 2021 14:22 )  x     / x     / 69 U/L / x     / x            Imaging:  No post-op imaging studies    Assessment:  46yM s/p laparoscopic gastric bypass with intraoperative endoscopy.    Plan:    - Monitor vitals  - Monitor post-op labs and replete as necessary  - Monitor for bowel function  - Pain management  - Inspiratory spirometer  - Encourage ambulation  - Monitor I's and O's  - Bariatric clears diet  - Lovenox 40 BID for DVT PPX  - GI PPX  - CPAP at night      Date/Time: 09-20-21 @ 17:18

## 2021-09-20 NOTE — CONSULT NOTE ADULT - ATTENDING COMMENTS
patient seen in the PACU immediately after OR   Hypertensive , will resume home meds  OOB and ambulate   continue hydration   CPAP overnight   admit to SDU

## 2021-09-21 ENCOUNTER — TRANSCRIPTION ENCOUNTER (OUTPATIENT)
Age: 46
End: 2021-09-21

## 2021-09-21 VITALS
TEMPERATURE: 98 F | SYSTOLIC BLOOD PRESSURE: 124 MMHG | HEART RATE: 81 BPM | DIASTOLIC BLOOD PRESSURE: 65 MMHG | RESPIRATION RATE: 15 BRPM

## 2021-09-21 LAB
ALBUMIN SERPL ELPH-MCNC: 3.8 G/DL — SIGNIFICANT CHANGE UP (ref 3.5–5.2)
ALP SERPL-CCNC: 54 U/L — SIGNIFICANT CHANGE UP (ref 30–115)
ALT FLD-CCNC: 59 U/L — HIGH (ref 0–41)
ANION GAP SERPL CALC-SCNC: 11 MMOL/L — SIGNIFICANT CHANGE UP (ref 7–14)
AST SERPL-CCNC: 54 U/L — HIGH (ref 0–41)
BILIRUB SERPL-MCNC: 0.4 MG/DL — SIGNIFICANT CHANGE UP (ref 0.2–1.2)
BUN SERPL-MCNC: 24 MG/DL — HIGH (ref 10–20)
CALCIUM SERPL-MCNC: 8.4 MG/DL — LOW (ref 8.5–10.1)
CHLORIDE SERPL-SCNC: 101 MMOL/L — SIGNIFICANT CHANGE UP (ref 98–110)
CO2 SERPL-SCNC: 24 MMOL/L — SIGNIFICANT CHANGE UP (ref 17–32)
COVID-19 SPIKE DOMAIN AB INTERP: POSITIVE
COVID-19 SPIKE DOMAIN ANTIBODY RESULT: >250 U/ML — HIGH
CREAT SERPL-MCNC: 1.3 MG/DL — SIGNIFICANT CHANGE UP (ref 0.7–1.5)
GLUCOSE BLDC GLUCOMTR-MCNC: 107 MG/DL — HIGH (ref 70–99)
GLUCOSE BLDC GLUCOMTR-MCNC: 117 MG/DL — HIGH (ref 70–99)
GLUCOSE SERPL-MCNC: 108 MG/DL — HIGH (ref 70–99)
MAGNESIUM SERPL-MCNC: 2 MG/DL — SIGNIFICANT CHANGE UP (ref 1.8–2.4)
PHOSPHATE SERPL-MCNC: 3.9 MG/DL — SIGNIFICANT CHANGE UP (ref 2.1–4.9)
POTASSIUM SERPL-MCNC: 5.1 MMOL/L — HIGH (ref 3.5–5)
POTASSIUM SERPL-SCNC: 5.1 MMOL/L — HIGH (ref 3.5–5)
PROT SERPL-MCNC: 6.5 G/DL — SIGNIFICANT CHANGE UP (ref 6–8)
SARS-COV-2 IGG+IGM SERPL QL IA: >250 U/ML — HIGH
SARS-COV-2 IGG+IGM SERPL QL IA: POSITIVE
SODIUM SERPL-SCNC: 136 MMOL/L — SIGNIFICANT CHANGE UP (ref 135–146)

## 2021-09-21 PROCEDURE — 99024 POSTOP FOLLOW-UP VISIT: CPT

## 2021-09-21 PROCEDURE — 99291 CRITICAL CARE FIRST HOUR: CPT | Mod: 24

## 2021-09-21 PROCEDURE — 71045 X-RAY EXAM CHEST 1 VIEW: CPT | Mod: 26

## 2021-09-21 RX ADMIN — ENOXAPARIN SODIUM 40 MILLIGRAM(S): 100 INJECTION SUBCUTANEOUS at 06:14

## 2021-09-21 RX ADMIN — LOSARTAN POTASSIUM 100 MILLIGRAM(S): 100 TABLET, FILM COATED ORAL at 06:11

## 2021-09-21 RX ADMIN — TRAMADOL HYDROCHLORIDE 50 MILLIGRAM(S): 50 TABLET ORAL at 10:54

## 2021-09-21 RX ADMIN — GABAPENTIN 800 MILLIGRAM(S): 400 CAPSULE ORAL at 13:54

## 2021-09-21 RX ADMIN — SODIUM CHLORIDE 125 MILLILITER(S): 9 INJECTION, SOLUTION INTRAVENOUS at 06:15

## 2021-09-21 RX ADMIN — Medication 400 MILLIGRAM(S): at 16:39

## 2021-09-21 RX ADMIN — TRAMADOL HYDROCHLORIDE 50 MILLIGRAM(S): 50 TABLET ORAL at 16:39

## 2021-09-21 RX ADMIN — GABAPENTIN 800 MILLIGRAM(S): 400 CAPSULE ORAL at 17:09

## 2021-09-21 RX ADMIN — PANTOPRAZOLE SODIUM 40 MILLIGRAM(S): 20 TABLET, DELAYED RELEASE ORAL at 13:53

## 2021-09-21 RX ADMIN — GABAPENTIN 800 MILLIGRAM(S): 400 CAPSULE ORAL at 06:11

## 2021-09-21 RX ADMIN — Medication 400 MILLIGRAM(S): at 10:00

## 2021-09-21 NOTE — DISCHARGE NOTE NURSING/CASE MANAGEMENT/SOCIAL WORK - NSDCPEFALRISK_GEN_ALL_CORE
For information on Fall & injury Prevention, visit [email or web address]

## 2021-09-21 NOTE — PROGRESS NOTE ADULT - ASSESSMENT
A/P:   46y M POD1 lap gastric bypass, intraop endoscopy and bilateral tap blocks  -Continue kailash clears  -Continue home pain meds (gabapentin) as well as prn tylenol and toradol  -Ambulate and oob to chair as able with assistance   -Dvt ppx (bid lov 40mg)  -Appreciate SICU  -If continues to do well then likely home later today    Chantal Naik MD  MIS Fellow    [place] TEAM SPECTRA [id number]

## 2021-09-21 NOTE — DISCHARGE NOTE PROVIDER - NSDCCPTREATMENT_GEN_ALL_CORE_FT
PRINCIPAL PROCEDURE  Procedure: Laparoscopic gastric bypass  Findings and Treatment:       SECONDARY PROCEDURE  Procedure: Upper endoscopy  Findings and Treatment:

## 2021-09-21 NOTE — DISCHARGE NOTE PROVIDER - NSDCFUADDINST_GEN_ALL_CORE_FT
Follow up with Dr Horton as scheduled call office for appointment   Follow up with your PMD    no strenuous activity  keep wound clean and dry  no tub bath  resume home medications as per bariatric    if experience fever, shortness of breath, chest pain , dizziness, vomiting , bleeding or drainage from wound call MD or return ot ED

## 2021-09-21 NOTE — PROGRESS NOTE ADULT - ATTENDING COMMENTS
Pt. seen and examined this am.  He was doing well, tolerating po liquids, ambulated to the bathroom with assistance, voiding freely, no abdominal pain.    AVSS  A&OX3, NAD  Abd soft, NT, ND, incisions C/D/I      s/p laparoscopic gastric bypass POD#1, doing well  Encourage po liquids  DVT prophylaxis to be continued at home (Lovenox)  D/C home today
stable overnight   BP well controlled   tolerating bariatric diet   OOB and ambulating   voiding   trolled   repeat K   discharge as per primary team

## 2021-09-21 NOTE — DISCHARGE NOTE PROVIDER - CARE PROVIDER_API CALL
Eliz Horton (MD)  Surgery  [address] [place] [address], [address] [place] [address]  Phone: [phone]  Fax: [phone]  Follow Up Time:

## 2021-09-21 NOTE — CHART NOTE - NSCHARTNOTEFT_GEN_A_CORE
pt without complaints. tolerated bariatric diet, voided and ambulated. vital signs stable and afebrile,. pt doing well. As per Dr Horton pt can be discharged home today. pt advised to follow up with Dr Horton as scheduled. Resume home medications and precaution per bariatric surgery.   All questions answered @ this time.

## 2021-09-21 NOTE — PROGRESS NOTE ADULT - ASSESSMENT
Assessment & Plan    46y Male with pmhx of HTN, COPD/asthma, ABI, chronic pain from work accident, s/p laparoscopic gastric bypass, bilateral TAP block, and intra-op endoscopy, POD#0. SICU team consulted to monitor hemodynamic stability.    NEURO:  AAOx3    Acute pain-controlled with tylenol, tramadol, gabapentin, dilaudid, toradol PRN  Home Rx:gabapentin 800 mg oral tablet  traMADol 50 mg oral tablet  Tylenol 325 mg oral tablet    RESP:   ABI on CPAP (patient brought own)    COPD/asthma- not taking any meds  Oxygenation-wean off NC to RA as tolerated    CARDS:   HTN: on losartan 100mg daily    per primary team, restart home medication immediately post op  post-op EKG: NSR, low voltage QRS, QTc     GI/NUTR:     Diet, Clear Liquid    GI Prophylaxis- PPI    Bowel regimen- none    Nausea/Vomiting: zofran prn    /RENAL:        Voided post op       LR 125cc/hr       post-op labs: Na 136, K 5.1, BUN/Cr 24/1.3, mag 2.0, phos 3.9    HEME/ONC:     DVT prophylaxis-enoxaparin Injectable 40mg BID, SCDs    H&H: 11.0/33    ID:  afebrile  Temp trend- 24hrs T(F): 98.7 (09-20 @ 13:05), Max: 99.3 (09-20 @ 07:23)  Antibiotics- cefotetan 3g intra-op -- completed  WBC 16.7    ENDO:    FS  q6hrs     HA1C: 5.6%    LINES/DRAINS:  PIV     Advanced Directives: Presumed Full Code    HCP/Emergency Contact-    DISPO: SICU, discussed with Dr. Ferrera Assessment & Plan    46y Male with pmhx of HTN, COPD/asthma, ABI, chronic pain from work accident, s/p laparoscopic gastric bypass, bilateral TAP block, and intra-op endoscopy, POD#1. SICU team consulted to monitor hemodynamic stability.    NEURO:  AAOx3    Acute pain-controlled with tylenol, tramadol, gabapentin, dilaudid, toradol PRN  Home Rx:gabapentin 800 mg oral tablet  traMADol 50 mg oral tablet  Tylenol 325 mg oral tablet    RESP:   ABI on CPAP (patient brought own)    COPD/asthma- not taking any meds  Oxygenation-wean off NC to RA as tolerated    CARDS:   HTN: on losartan 100mg daily    per primary team, restart home medication immediately post op  post-op EKG: NSR, low voltage QRS, QTc   f/u EKG    GI/NUTR:     Diet, Clear Liquid    GI Prophylaxis- PPI    Bowel regimen- none    Nausea/Vomiting: zofran prn    /RENAL:        Voided post op       LR 125cc/hr       post-op labs: Na 136, K 5.1, BUN/Cr 24/1.3, mag 2.0, phos 3.9  f/u BMP @1100      HEME/ONC:     DVT prophylaxis-enoxaparin Injectable 40mg BID, SCDs    Labs: Hb/Hct:  11.5/36.0  -->,  11.0/33.8  -->                      Plts:  308  -->,  298  -->          ID:  afebrile  Temp trend- 24hrs T(F): 98.7 (09-20 @ 13:05), Max: 99.3 (09-20 @ 07:23)  Antibiotics- cefotetan 3g intra-op -- completed  WBC 16.7    ENDO:    FS  q6hrs     HA1C: 5.6%    LINES/DRAINS:  PIV     Advanced Directives: Presumed Full Code    HCP/Emergency Contact-    DISPO: SICU, discussed with Dr. Ferrera

## 2021-09-21 NOTE — DISCHARGE NOTE NURSING/CASE MANAGEMENT/SOCIAL WORK - PATIENT PORTAL LINK FT
You can access the FollowMyHealth Patient Portal offered by [facility] by registering at the following website: [email or web address]. By joining [facility]’s FollowMyHealth portal, you will also be able to view your health information using other applications (apps) compatible with our system.

## 2021-09-21 NOTE — DISCHARGE NOTE PROVIDER - NSDCMRMEDTOKEN_GEN_ALL_CORE_FT
gabapentin 800 mg oral tablet: 1 tab(s) orally 4 times a day  losartan 100 mg oral tablet: 1 tab(s) orally once a day (at bedtime)  traMADol 50 mg oral tablet: 1 tab(s) orally every 4 hours  Tylenol 325 mg oral tablet: 2 tab(s) orally every 4 hours, As Needed

## 2021-09-21 NOTE — DISCHARGE NOTE PROVIDER - HOSPITAL COURSE
47 y/o M, with pmhx of HTN, COPD/asthma, ABI, fatty liver, chronic pain from work accident presents to [facility] for elective surgery pt underwent  laparoscopic gastric bypass, bilateral TAP block, and intra-op endoscopy. Post operatively pt upgraded to SICU for close monitoring.   bariatric diet advanced as tolerated. pt voided and ambulated without complaints.   On 9/21/2021 pt doing well and discharged home in stable condition. pt advised to follow up with Dr Horton as scheduled. Resume home medications and precaution per bariatric surgery.

## 2021-09-21 NOTE — PROGRESS NOTE ADULT - SUBJECTIVE AND OBJECTIVE BOX
Admit Date: 09-20-21  Hospital LOS:   ICU Admission Date: 9/20/21  OR #1- 9/20/21        ============================   HPI   HPI: 45 y/o M, with pmhx of HTN, COPD/asthma, ABI, fatty liver, chronic pain from work accident, s/p laparoscopic gastric bypass, bilateral TAP block, and intra-op endoscopy, POD#0. SICU team consulted to monitor hemodynamic stability.    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  COPD (chronic obstructive pulmonary disease)  Fatty liver  HTN (hypertension)  ABI (obstructive sleep apnea)  S/P ORIF (open reduction internal fixation) fracture  LT ANKLE  H/O arthroscopy of knee  RT -2017 -SPINAL  History of carpal tunnel release of both wrists  06/2018 RIght    Home Meds:  Home Medications:  gabapentin 800 mg oral tablet: 1 tab(s) orally 4 times a day (20 Sep 2021 07:17)  losartan 100 mg oral tablet: 1 tab(s) orally once a day (at bedtime) (20 Sep 2021 07:17)  traMADol 50 mg oral tablet: 1 tab(s) orally every 4 hours (20 Sep 2021 07:17)  Tylenol 325 mg oral tablet: 2 tab(s) orally every 4 hours, As Needed (20 Sep 2021 07:17)    Allergies  No Known Allergies    24 Hour Events  -Admission under SICU service  9/20  Day  f/u post-op labs  EKG (9/20): NSR, low voltage QRS, QTc 423, looks like some PVCs, would re-take due to poor quality  - clears  - lovenox 40mg BID  - restart losartan per primary team  - JACQUELYN trop <0.01  - mag repleted  - voided    Night  -ambulating  -voiding  -used CPAP overnight  -tolerating CLD  -denies nausea/vomiting    [X] A ten-point review of systems was otherwise negative except as noted above.  [  ] Due to altered mental status/intubation, subjective information was not attained from the patient. History was obtained, to the extent possible, from review of the chart and collateral sources of information.       Admit Date: 09-20-21  Hospital LOS:   ICU Admission Date: 9/20/21  OR #1- 9/20/21        ============================   HPI   HPI: 45 y/o M, with pmhx of HTN, COPD/asthma, ABI, fatty liver, chronic pain from work accident, s/p laparoscopic gastric bypass, bilateral TAP block, and intra-op endoscopy, POD#0. SICU team consulted to monitor hemodynamic stability.    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  COPD (chronic obstructive pulmonary disease)  Fatty liver  HTN (hypertension)  ABI (obstructive sleep apnea)  S/P ORIF (open reduction internal fixation) fracture  LT ANKLE  H/O arthroscopy of knee  RT -2017 -SPINAL  History of carpal tunnel release of both wrists  06/2018 RIght    Home Meds:  Home Medications:  gabapentin 800 mg oral tablet: 1 tab(s) orally 4 times a day (20 Sep 2021 07:17)  losartan 100 mg oral tablet: 1 tab(s) orally once a day (at bedtime) (20 Sep 2021 07:17)  traMADol 50 mg oral tablet: 1 tab(s) orally every 4 hours (20 Sep 2021 07:17)  Tylenol 325 mg oral tablet: 2 tab(s) orally every 4 hours, As Needed (20 Sep 2021 07:17)    Allergies  No Known Allergies    24 Hour Events  -Admission under SICU service  9/20  Day  f/u post-op labs  EKG (9/20): NSR, low voltage QRS, QTc 423, looks like some PVCs, would re-take due to poor quality  - clears  - lovenox 40mg BID  - restart losartan per primary team  - JACQUELYN trop <0.01  - mag repleted  - voided    Night  -ambulating  -voiding  -used CPAP overnight  -tolerating CLD  -denies nausea/vomiting    [X] A ten-point review of systems was otherwise negative except as noted above.  [  ] Due to altered mental status/intubation, subjective information was not attained from the patient. History was obtained, to the extent possible, from review of the chart and collateral sources of information.    PE:  GA: AAOX3, in NAD  CV: normal s1s2  Pulm: CTAB, Spo2 94-98%  abd: BS+, soft, nontender, 6 laparoscopic incisions covered with dermabond  LE: no calf tenderness, no edema

## 2021-09-21 NOTE — PROGRESS NOTE ADULT - SUBJECTIVE AND OBJECTIVE BOX
Surgery Progress Note       Patient: RIGOBERTO LAKE , 46y ([birth year])Male   MRN: [id number]  Location: [address]  Visit: 09-20-21 Inpatient  Date: 09-21-21 @ 08:39        Admitted :09-20-21 (1d)  LOS: 1d    Procedure/Dx/Injuries: POD1 lap gastric bypass    Events of past 24 hours: C/o abdominal soreness this am, but controlled. OOB yesterday and voided. Tolerated up to 3oz already of kailash clears with no issues or nausea. No flatus.       Vitals:   T(F): 98.8 (09-21-21 @ 08:22), Max: 99.1 (09-20-21 @ 19:00)  HR: 84 (09-21-21 @ 08:22)  BP: 140/69 (09-21-21 @ 08:22)  RR: 18 (09-21-21 @ 08:22)  SpO2: 94% (09-21-21 @ 08:22)      Diet, Clear Liquid:   Bariatric Clear Liquid (BARICLLIQ)      Fluids: lactated ringers.: Solution, 1000 milliLiter(s) infuse at 125 mL/Hr      I & O's:    09-20-21 @ 07:01  -  09-21-21 @ 07:00  --------------------------------------------------------  IN:    IV PiggyBack: 150 mL    Lactated Ringers: 2250 mL    Oral Fluid: 420 mL  Total IN: 2820 mL    OUT:    Voided (mL): 1001 mL  Total OUT: 1001 mL    Total NET: 1819 mL            PHYSICAL EXAM:  General Appearance: NAD  HEENT: EOMI, sclera non-icteric.  Heart: RRR   Lungs: No increased work of breathing or accessory muscle use. Symmetric chest wall rise and fall.   Abdomen:  Soft, approp mod ttp, incisions with dermabond c/d/i  MSK/Extremities: Warm & well-perfused.   Skin: Warm, dry. No jaundice.       MEDICATIONS  (STANDING):  enoxaparin Injectable 40 milliGRAM(s) SubCutaneous every 12 hours  gabapentin Solution 800 milliGRAM(s) Oral four times a day  lactated ringers. 1000 milliLiter(s) (125 mL/Hr) IV Continuous <Continuous>  losartan 100 milliGRAM(s) Oral daily  pantoprazole  Injectable 40 milliGRAM(s) IV Push daily    MEDICATIONS  (PRN):  acetaminophen  IVPB .. 1000 milliGRAM(s) IV Intermittent once PRN Moderate Pain (4 - 6)  acetaminophen  IVPB .. 1000 milliGRAM(s) IV Intermittent once PRN Moderate Pain (4 - 6)  acetaminophen  IVPB .. 1000 milliGRAM(s) IV Intermittent once PRN Moderate Pain (4 - 6)  traMADol 50 milliGRAM(s) Oral every 4 hours PRN Moderate Pain (4 - 6)      DVT PROPHYLAXIS: enoxaparin Injectable 40 milliGRAM(s) SubCutaneous every 12 hours    GI PROPHYLAXIS: pantoprazole  Injectable 40 milliGRAM(s) IV Push daily            LAB/STUDIES:  Labs:  CAPILLARY BLOOD GLUCOSE      POCT Blood Glucose.: 117 mg/dL (21 Sep 2021 07:27)  POCT Blood Glucose.: 100 mg/dL (20 Sep 2021 23:40)  POCT Blood Glucose.: 120 mg/dL (20 Sep 2021 19:13)                          11.0   16.71 )-----------( 298      ( 20 Sep 2021 22:56 )             33.8         09-20    136  |  101  |  24<H>  ----------------------------<  108<H>  5.1<H>   |  24  |  1.3      Magnesium, Serum: 2.0 mg/dL (09-20-21 @ 22:56)      LFTs:             6.5  | 0.4  | 54       ------------------[54      ( 20 Sep 2021 22:56 )  3.8  | x    | 59          Lipase:x      Amylase:x             Coags:    CARDIAC MARKERS ( 20 Sep 2021 14:45 )  x     / <0.01 ng/mL / x     / x     / 2.4 ng/mL  CARDIAC MARKERS ( 20 Sep 2021 14:22 )  x     / x     / 69 U/L / x     / x

## 2021-09-22 ENCOUNTER — NON-APPOINTMENT (OUTPATIENT)
Age: 46
End: 2021-09-22

## 2021-09-23 ENCOUNTER — NON-APPOINTMENT (OUTPATIENT)
Age: 46
End: 2021-09-23

## 2021-09-24 ENCOUNTER — NON-APPOINTMENT (OUTPATIENT)
Age: 46
End: 2021-09-24

## 2021-09-24 DIAGNOSIS — M54.9 DORSALGIA, UNSPECIFIED: ICD-10-CM

## 2021-09-24 DIAGNOSIS — Z99.89 DEPENDENCE ON OTHER ENABLING MACHINES AND DEVICES: ICD-10-CM

## 2021-09-24 DIAGNOSIS — K76.0 FATTY (CHANGE OF) LIVER, NOT ELSEWHERE CLASSIFIED: ICD-10-CM

## 2021-09-24 DIAGNOSIS — M54.2 CERVICALGIA: ICD-10-CM

## 2021-09-24 DIAGNOSIS — I10 ESSENTIAL (PRIMARY) HYPERTENSION: ICD-10-CM

## 2021-09-24 DIAGNOSIS — J44.9 CHRONIC OBSTRUCTIVE PULMONARY DISEASE, UNSPECIFIED: ICD-10-CM

## 2021-09-24 DIAGNOSIS — E66.01 MORBID (SEVERE) OBESITY DUE TO EXCESS CALORIES: ICD-10-CM

## 2021-09-24 DIAGNOSIS — G89.29 OTHER CHRONIC PAIN: ICD-10-CM

## 2021-09-24 DIAGNOSIS — G47.33 OBSTRUCTIVE SLEEP APNEA (ADULT) (PEDIATRIC): ICD-10-CM

## 2021-09-24 DIAGNOSIS — M25.561 PAIN IN RIGHT KNEE: ICD-10-CM

## 2021-09-28 PROBLEM — E66.01 MORBID OBESITY DUE TO EXCESS CALORIES: Status: RESOLVED | Noted: 2020-08-26 | Resolved: 2021-09-28

## 2021-09-28 NOTE — HISTORY OF PRESENT ILLNESS
[de-identified] : RIGOBERTO LAKE underwent extensive preoperative workup and is scheduled to have Laparoscopic Gastric Bypass on 9/20/2021. Patient will benefit from surgery to improve his quality of life and for management of his comorbid conditions.\par At this preoperative visit, we discussed the risks, benefits and alternatives to weight loss surgery. We specifically discussed the risks of anesthesia including cardiac and pulmonary complications, DVT/PE, pneumonia, leaks, infection, death, bleeding, ulcers, and need for additional operations have been reviewed. We discussed the importance of a consistent diet and exercise regimen in regards to weight loss and maintenance as well. The importance of lifelong mineral and vitamin supplementation was also reviewed with the patient. The patient was given ample opportunity to ask questions and all questions were answered.\par

## 2021-09-28 NOTE — ASSESSMENT
[FreeTextEntry1] : RIGOBERTO LAKE is a 46 year male seen today for Preoperative Bariatric visit. he will require Lovenox postop and patient was instructed in the administration of the administration with good return demonstration.   All other medications were reviewed with patient as well and instructions were given in respect to medications to take on the day of surgery as well as postoperatively.  Written instructions and materials were provided.  All questions were answered.\par \par

## 2021-09-28 NOTE — REVIEW OF SYSTEMS
[Joint Pain] : joint pain [Joint Stiffness] : joint stiffness [Muscle Pain] : muscle pain [Muscle Weakness] : muscle weakness [Negative] : Allergic/Immunologic [FreeTextEntry9] : Back and knees. Ambulates with 2 canes

## 2021-09-28 NOTE — PLAN
[FreeTextEntry1] : Plan: Proceed with surgery on 9/16/2021.\par          RTO for postop visit.\par          Call with concerns.

## 2021-09-29 ENCOUNTER — APPOINTMENT (OUTPATIENT)
Dept: SURGERY | Facility: CLINIC | Age: 46
End: 2021-09-29
Payer: MEDICARE

## 2021-09-29 VITALS
WEIGHT: 315 LBS | HEART RATE: 90 BPM | HEIGHT: 67 IN | OXYGEN SATURATION: 98 % | TEMPERATURE: 95.2 F | SYSTOLIC BLOOD PRESSURE: 119 MMHG | BODY MASS INDEX: 49.44 KG/M2 | DIASTOLIC BLOOD PRESSURE: 66 MMHG

## 2021-09-29 DIAGNOSIS — J45.909 UNSPECIFIED ASTHMA, UNCOMPLICATED: ICD-10-CM

## 2021-09-29 DIAGNOSIS — J44.9 CHRONIC OBSTRUCTIVE PULMONARY DISEASE, UNSPECIFIED: ICD-10-CM

## 2021-09-29 PROCEDURE — 99024 POSTOP FOLLOW-UP VISIT: CPT

## 2021-09-29 RX ORDER — CALCIUM CARB/VITAMIN D3/VIT K1 650MG-12.5
650-12.5-4 TABLET,CHEWABLE ORAL TWICE DAILY
Refills: 0 | Status: ACTIVE | COMMUNITY
Start: 2021-09-29

## 2021-09-29 RX ORDER — ASTRAGALUS ROOT 470 MG
500 CAPSULE ORAL DAILY
Refills: 0 | Status: ACTIVE | COMMUNITY
Start: 2021-09-29

## 2021-09-29 RX ORDER — PEDI MULTIVIT NO.25/FOLIC ACID 300 MCG
18 TABLET,CHEWABLE ORAL TWICE DAILY
Refills: 0 | Status: ACTIVE | COMMUNITY
Start: 2021-09-29

## 2021-09-29 NOTE — REVIEW OF SYSTEMS
[Joint Pain] : joint pain [Joint Stiffness] : joint stiffness [Muscle Pain] : muscle pain [Negative] : Allergic/Immunologic

## 2021-09-29 NOTE — HISTORY OF PRESENT ILLNESS
[Phase 2] : Phase 2 [Walking] : walking [Date of Surgery: ___] : Date of Surgery:   [unfilled] [Pre-Op Weight ___] : Pre-op weight was [unfilled] lbs [de-identified] : Patient had surgery approximately 9 days ago. He still feels a bit fatigued. Patient is compliant with vitamins and minerals and takes his Lovenox daily.\par He was burping more than usual but found it that it was related to garlic. He denies heartburn/reflux/vomiting. \par He had a low grade fever which has since resolved and denies SOB.\par He no longer has his CPAP machine as the insurance company is picking it up. His hypertension, asthma, COPD and lower back pain is unchanged. \par

## 2021-09-29 NOTE — PHYSICAL EXAM
[de-identified] : Soft, nondistended [de-identified] : Incisions dry and clean with no evidence of erythema

## 2021-09-29 NOTE — ADDENDUM
[FreeTextEntry1] : \par Pt. seen and examined. Agree with above.  Afebrile. Ambulating every hour while awake. Administering Lovenox daily for DVT prophylaxis. Tolerating diet.\par No calf pain, no SOB.\par PLan as above.

## 2021-09-29 NOTE — ASSESSMENT
[___ Days Post Op] : [unfilled] days [Previous Visit Weight: ___] : Previous visit weight: [unfilled] lbs [Today's Weight: ___] : Today's weight:[unfilled] lbs [de-identified] : RIGOBERTO LAKE is a 46 year male seen today for postoperative bariatric follow up visit. \par He advanced his diet and tolerated lentil soup and peanut butter and is drinking 2 protein shakes daily.\par Fluid intake is adequate with mostly water, Jello and teas.\par He is ambulating every hour while he is awake. Reinforced no heavy lifting, bending and pulling for 2 months.\par \par Plan: RTO in 3 weeks. Advance diet as tolerated. Continue Lovenox as prescribed. Call with concerns

## 2021-10-19 RX ORDER — ENOXAPARIN SODIUM 100 MG/ML
60 INJECTION SUBCUTANEOUS DAILY
Qty: 30 | Refills: 0 | Status: DISCONTINUED | COMMUNITY
Start: 2021-09-13 | End: 2021-10-19

## 2021-10-22 ENCOUNTER — APPOINTMENT (OUTPATIENT)
Dept: SURGERY | Facility: CLINIC | Age: 46
End: 2021-10-22
Payer: MEDICARE

## 2021-10-22 VITALS
OXYGEN SATURATION: 98 % | TEMPERATURE: 97.8 F | HEIGHT: 67 IN | WEIGHT: 315 LBS | BODY MASS INDEX: 49.44 KG/M2 | SYSTOLIC BLOOD PRESSURE: 126 MMHG | HEART RATE: 91 BPM | DIASTOLIC BLOOD PRESSURE: 84 MMHG

## 2021-10-22 PROCEDURE — 99024 POSTOP FOLLOW-UP VISIT: CPT

## 2021-10-22 NOTE — HISTORY OF PRESENT ILLNESS
[de-identified] : Jacky is doing very well.  Tolerating meatloaf, beef barley soup, having bowel movements, no vomiting. No pain.  Ambulating frequently. HTN improved, his PMD lowered his Losartan to 50mg.

## 2021-10-22 NOTE — REASON FOR VISIT
[S/P Bariatric Surgery] : s/p bariatric surgery [Follow-Up Visit] : a follow-up visit for [FreeTextEntry2] : gastric bypass 9/20/2021

## 2021-10-22 NOTE — PLAN
[FreeTextEntry1] : Advance diet as tolerated\par Bloodwork before next visit\par Ursodiol Rx e-prescribed\par F/u in 2 months

## 2021-10-28 ENCOUNTER — APPOINTMENT (OUTPATIENT)
Dept: VASCULAR SURGERY | Facility: CLINIC | Age: 46
End: 2021-10-28
Payer: MEDICARE

## 2021-10-28 ENCOUNTER — NON-APPOINTMENT (OUTPATIENT)
Age: 46
End: 2021-10-28

## 2021-10-28 ENCOUNTER — TRANSCRIPTION ENCOUNTER (OUTPATIENT)
Age: 46
End: 2021-10-28

## 2021-10-28 PROCEDURE — 93971 EXTREMITY STUDY: CPT

## 2021-11-15 ENCOUNTER — TRANSCRIPTION ENCOUNTER (OUTPATIENT)
Age: 46
End: 2021-11-15

## 2021-11-29 ENCOUNTER — RX RENEWAL (OUTPATIENT)
Age: 46
End: 2021-11-29

## 2021-12-17 ENCOUNTER — APPOINTMENT (OUTPATIENT)
Dept: SURGERY | Facility: CLINIC | Age: 46
End: 2021-12-17
Payer: MEDICARE

## 2021-12-17 VITALS
WEIGHT: 315 LBS | SYSTOLIC BLOOD PRESSURE: 136 MMHG | HEIGHT: 67 IN | TEMPERATURE: 97.3 F | DIASTOLIC BLOOD PRESSURE: 79 MMHG | OXYGEN SATURATION: 99 % | BODY MASS INDEX: 49.44 KG/M2 | HEART RATE: 74 BPM

## 2021-12-17 PROCEDURE — 99024 POSTOP FOLLOW-UP VISIT: CPT

## 2021-12-17 NOTE — PLAN
[FreeTextEntry1] : BW done but results pending.  Will f/u results.\par Try to take PPI at night instead in the am to see if it help with am nausea.\par F/u with PMD about excess mucus/congestion.  I reminded pt that he cannot take NSAIDs.\par BW for 6 month visit. \par

## 2021-12-17 NOTE — ASSESSMENT
[FreeTextEntry1] : 45 y/o male s/p gastric bypass 3 months ago, down 31% EBW (87 pounds) doing great.

## 2021-12-17 NOTE — HISTORY OF PRESENT ILLNESS
[de-identified] : Jacky is doing well s/p gastric bypass 3 months ago (9/20/2021).  He is tolerating his diet.  He does dry heave in the morning because of congestion/phlegm/mucus.  He has not spoken with his PMD about this yet.  he is taking his MVI and B12.  He is no longer needing his Losartan.  He notices an asymmetry in his lower right abdomen but it does not hurt or bother him.  He says it has become more pronounced as he loses more weight.

## 2022-03-17 ENCOUNTER — APPOINTMENT (OUTPATIENT)
Dept: SURGERY | Facility: CLINIC | Age: 47
End: 2022-03-17
Payer: MEDICARE

## 2022-03-17 VITALS
HEIGHT: 67 IN | SYSTOLIC BLOOD PRESSURE: 128 MMHG | TEMPERATURE: 97 F | OXYGEN SATURATION: 98 % | BODY MASS INDEX: 44.86 KG/M2 | DIASTOLIC BLOOD PRESSURE: 76 MMHG | WEIGHT: 285.8 LBS | HEART RATE: 76 BPM

## 2022-03-17 PROCEDURE — 99213 OFFICE O/P EST LOW 20 MIN: CPT

## 2022-03-17 RX ORDER — URSODIOL 300 MG/1
300 CAPSULE ORAL
Qty: 60 | Refills: 5 | Status: DISCONTINUED | COMMUNITY
Start: 2021-10-19 | End: 2022-03-17

## 2022-03-17 RX ORDER — BACLOFEN 10 MG/1
10 TABLET ORAL
Refills: 0 | Status: ACTIVE | COMMUNITY

## 2022-03-17 NOTE — HISTORY OF PRESENT ILLNESS
[de-identified] : Jacky is doing great.  He is tolerating most foods except for chicken and beef.  he can eat ground turkey and fish as well as vegetables and he tried a small piece of bread without a problem.  He is compliant with his vitamins and minerals.  His blood pressure is great off of  his antihypertensive medication.  He said his wife told him he does not snore anymore, so his sleep apnea is improving.  He walks several times during the day.  He does not have GERD or constipation. \par \par Blood work reviewed which was essentially normal except for mildly elevated LDL.

## 2022-03-17 NOTE — PLAN
[FreeTextEntry1] : Continue to increase exercise and take vitamins\par See pulmonologist for ABI re-evaluation\par F/u in 3 months with us\par Call sooner with any questions or concerns

## 2022-03-17 NOTE — REASON FOR VISIT
[Follow-Up Visit] : a follow-up visit for [S/P Bariatric Surgery] : s/p bariatric surgery [FreeTextEntry2] : gastric bypass 9/20/21

## 2022-04-18 ENCOUNTER — RX RENEWAL (OUTPATIENT)
Age: 47
End: 2022-04-18

## 2022-06-15 ENCOUNTER — LABORATORY RESULT (OUTPATIENT)
Age: 47
End: 2022-06-15

## 2022-06-21 ENCOUNTER — APPOINTMENT (OUTPATIENT)
Dept: ORTHOPEDIC SURGERY | Facility: CLINIC | Age: 47
End: 2022-06-21
Payer: OTHER MISCELLANEOUS

## 2022-06-21 PROCEDURE — 99213 OFFICE O/P EST LOW 20 MIN: CPT

## 2022-06-21 PROCEDURE — 99072 ADDL SUPL MATRL&STAF TM PHE: CPT

## 2022-06-21 NOTE — HISTORY OF PRESENT ILLNESS
[de-identified] :  47-year-old male who was still having numbness and tingling his fingers.  Patient did have decompression both carpal tunnels years ago..  He does have report numbness and tingling back in the hands.  And he comes in for evaluation today.  Still complaining about the tingling in the fingers.  He has had gastric bypass surgery and has lost 200 lb he is not working and because of his carpal tunnel only has a mild partial temporary disability though he is symptomatic again and carpal tunnel in its been more than 1 year since surgery

## 2022-06-21 NOTE — ASSESSMENT
[FreeTextEntry1] :  Patient has bilateral carpal tunnel syndrome.  He does have recurrence of his carpal tunnel from her previous decompression.  He is not working.  Eventually he may require surgical intervention for a revision decompression.  Right now he is continuing to his low weight after is gastric bypass surgery he will see me back in 6 weeks.

## 2022-06-21 NOTE — PHYSICAL EXAM
[de-identified] :  Patient has good range of motion to both hands.  Good range of motion of both wrist.  He has good thenar strength without thenar atrophy.  Does have some diminished sensation median nerve distribution bilaterally.  Normal capillary refill no swelling erythema ecchymoses or abrasions

## 2022-06-22 ENCOUNTER — APPOINTMENT (OUTPATIENT)
Dept: SURGERY | Facility: CLINIC | Age: 47
End: 2022-06-22
Payer: MEDICARE

## 2022-06-22 VITALS
HEART RATE: 74 BPM | SYSTOLIC BLOOD PRESSURE: 108 MMHG | OXYGEN SATURATION: 98 % | WEIGHT: 250.4 LBS | TEMPERATURE: 97.1 F | HEIGHT: 67 IN | DIASTOLIC BLOOD PRESSURE: 70 MMHG | BODY MASS INDEX: 39.3 KG/M2

## 2022-06-22 DIAGNOSIS — Z86.79 PERSONAL HISTORY OF OTHER DISEASES OF THE CIRCULATORY SYSTEM: ICD-10-CM

## 2022-06-22 PROCEDURE — 99213 OFFICE O/P EST LOW 20 MIN: CPT

## 2022-06-22 NOTE — REASON FOR VISIT
[Gastric By-pass] : gastric by-pass [de-identified] : 9/20/2021 [de-identified] : Jacky is doing great 9 months after his gastric bypass.  His HTN and ABI are resolved. He is tolerating most foods.  He is compliant with his vitamins. He still has chronic neck and back pain from an injury.  He is happy with his weight loss.

## 2022-06-22 NOTE — ASSESSMENT
[___ Months Post Op] : [unfilled] months [de-identified] : Continue to make healthy food choices\par Increase exercise as tolerated\par BW before 1 year post-op visit\par F/u in 3 months\par Call sooner with questions or concerns.

## 2022-06-24 ENCOUNTER — NON-APPOINTMENT (OUTPATIENT)
Age: 47
End: 2022-06-24

## 2022-08-08 ENCOUNTER — APPOINTMENT (OUTPATIENT)
Dept: ORTHOPEDIC SURGERY | Facility: CLINIC | Age: 47
End: 2022-08-08

## 2022-09-26 ENCOUNTER — APPOINTMENT (OUTPATIENT)
Dept: ORTHOPEDIC SURGERY | Facility: CLINIC | Age: 47
End: 2022-09-26

## 2022-09-26 PROCEDURE — 99213 OFFICE O/P EST LOW 20 MIN: CPT

## 2022-09-26 PROCEDURE — 99072 ADDL SUPL MATRL&STAF TM PHE: CPT

## 2022-09-26 NOTE — ASSESSMENT
[FreeTextEntry1] :  Patient status post bilateral carpal tunnel release surgery.  He get a recurrence is a possibility of him needing a revision surgery.  He will see me back in 6 weeks he is seen disease with his home exercise program.  He continues to not be working because of his hands only has a mild partial temporary disability

## 2022-09-26 NOTE — PHYSICAL EXAM
[de-identified] :   Patient is well-healed surgical skin incisions from his previous surgical intervention for carpal tunnel release.  He has good thenar strength without thenar atrophy.  He has slight diminished sensation median nerve distribution.  He has normal capillary refill.  No erythema ecchymoses or abrasions

## 2022-09-27 ENCOUNTER — APPOINTMENT (OUTPATIENT)
Dept: SURGERY | Facility: CLINIC | Age: 47
End: 2022-09-27

## 2022-09-27 VITALS
DIASTOLIC BLOOD PRESSURE: 82 MMHG | WEIGHT: 214 LBS | TEMPERATURE: 96.8 F | OXYGEN SATURATION: 96 % | SYSTOLIC BLOOD PRESSURE: 122 MMHG | BODY MASS INDEX: 33.59 KG/M2 | HEART RATE: 78 BPM | HEIGHT: 67 IN

## 2022-09-27 DIAGNOSIS — M25.562 PAIN IN RIGHT KNEE: ICD-10-CM

## 2022-09-27 DIAGNOSIS — Z87.898 PERSONAL HISTORY OF OTHER SPECIFIED CONDITIONS: ICD-10-CM

## 2022-09-27 DIAGNOSIS — M25.561 PAIN IN RIGHT KNEE: ICD-10-CM

## 2022-09-27 LAB
25(OH)D3 SERPL-MCNC: 55 NG/ML
ALBUMIN SERPL ELPH-MCNC: 4.5 G/DL
ALP BLD-CCNC: 69 U/L
ALT SERPL-CCNC: 10 U/L
ANION GAP SERPL CALC-SCNC: 9 MMOL/L
AST SERPL-CCNC: 13 U/L
BASOPHILS # BLD AUTO: 0.05 K/UL
BASOPHILS NFR BLD AUTO: 0.7 %
BILIRUB SERPL-MCNC: 0.6 MG/DL
BUN SERPL-MCNC: 14 MG/DL
CALCIUM SERPL-MCNC: 9.8 MG/DL
CHLORIDE SERPL-SCNC: 103 MMOL/L
CHOLEST SERPL-MCNC: 171 MG/DL
CO2 SERPL-SCNC: 29 MMOL/L
CREAT SERPL-MCNC: 0.8 MG/DL
EGFR: 111 ML/MIN/1.73M2
EOSINOPHIL # BLD AUTO: 0.14 K/UL
EOSINOPHIL NFR BLD AUTO: 2.1 %
FERRITIN SERPL-MCNC: 161 NG/ML
FOLATE RBC-MCNC: 1266 NG/ML
GLUCOSE SERPL-MCNC: 92 MG/DL
HCT VFR BLD CALC: 41.3 %
HCT VFR BLD CALC: 41.3 %
HDLC SERPL-MCNC: 44 MG/DL
HGB BLD-MCNC: 13.8 G/DL
IMM GRANULOCYTES NFR BLD AUTO: 0.1 %
IRON SATN MFR SERPL: 45 %
IRON SERPL-MCNC: 98 UG/DL
LDLC SERPL CALC-MCNC: 111 MG/DL
LYMPHOCYTES # BLD AUTO: 2.12 K/UL
LYMPHOCYTES NFR BLD AUTO: 31.1 %
MAN DIFF?: NORMAL
MCHC RBC-ENTMCNC: 29.2 PG
MCHC RBC-ENTMCNC: 33.4 G/DL
MCV RBC AUTO: 87.3 FL
MONOCYTES # BLD AUTO: 0.39 K/UL
MONOCYTES NFR BLD AUTO: 5.7 %
NEUTROPHILS # BLD AUTO: 4.1 K/UL
NEUTROPHILS NFR BLD AUTO: 60.3 %
NONHDLC SERPL-MCNC: 127 MG/DL
PLATELET # BLD AUTO: 235 K/UL
POTASSIUM SERPL-SCNC: 4.6 MMOL/L
PROT SERPL-MCNC: 6.9 G/DL
RBC # BLD: 4.73 M/UL
RBC # FLD: 13.6 %
SODIUM SERPL-SCNC: 141 MMOL/L
TIBC SERPL-MCNC: 216 UG/DL
TRIGL SERPL-MCNC: 79 MG/DL
UIBC SERPL-MCNC: 118 UG/DL
VIT B12 SERPL-MCNC: 815 PG/ML
WBC # FLD AUTO: 6.81 K/UL

## 2022-09-27 PROCEDURE — 99213 OFFICE O/P EST LOW 20 MIN: CPT

## 2022-09-27 NOTE — HISTORY OF PRESENT ILLNESS
[de-identified] : Patient had surgery approximately 1 year ago. \par Denies reflux/heartburn/nausea. Vomits occasionally when he has a postnasal drip. Recommend that he follow up with ENT. Bowel movements are normal.\par Taking 2 multivitamins with iron, 2 Viactiv chews, 1 vitamin b 12 and daily.\par Compliant with CPAP use. Back and knee pain improved. Ambulates with a cane.

## 2022-09-27 NOTE — PHYSICAL EXAM
[Normal] : normoactive bowel sounds, soft and nontender, no hepatosplenomegaly or masses appreciated. Incisions healing appropriately without erythema or drainage [de-identified] : Soft, nondistended

## 2022-09-27 NOTE — PLAN
[FreeTextEntry1] : Plan: Continue with behavioral changes.\par          RTO in 6 months.\par          Will mail blood work requisition.\par          Call with concerns.\par

## 2022-09-27 NOTE — ASSESSMENT
[FreeTextEntry1] : RIGOBERTO LAKE is a 47 year male seen today for bariatric follow up visit. Patient is doing well with his weight loss goals.\par Patient reports that he has been dealing with some family issues but has made time for himself. He is eating eggs, chicken, steak, yogurt, protein shakes and bars and he added green leafy vegetables, a small serving of CHO and fruits. He is having a problem with dairy and is now using dairy free products.\par Fluid intake is adequate with water, and warm teas\par He is walking almost daily even though he ambulates with a cane and he added light weights.\par \par

## 2022-09-30 LAB — VIT B1 SERPL-MCNC: 142.7 NMOL/L

## 2022-10-31 ENCOUNTER — NON-APPOINTMENT (OUTPATIENT)
Age: 47
End: 2022-10-31

## 2022-11-11 ENCOUNTER — APPOINTMENT (OUTPATIENT)
Dept: ORTHOPEDIC SURGERY | Facility: CLINIC | Age: 47
End: 2022-11-11

## 2022-11-11 PROCEDURE — 99213 OFFICE O/P EST LOW 20 MIN: CPT

## 2022-11-11 PROCEDURE — 99072 ADDL SUPL MATRL&STAF TM PHE: CPT

## 2022-11-11 NOTE — PHYSICAL EXAM
[de-identified] :   Patient has good range of motion of the right left hand.  It patient has positive Tinel's across the right left elbow pain.  It is worse on the right side than the left side.  He has positive flex supination test worse on the right than he does on the left.  Id he has good range of motion to the fingers with no intrinsic wasting

## 2022-11-11 NOTE — ASSESSMENT
[FreeTextEntry1] :  Patient has carpal tunnel syndrome as well as cubital tunnel syndrome.  We need a worker's compensation authorization for an EMG to evaluate this.  It he will see us back in 6 weeks.  There is potential for eventual surgical intervention for cubital tunnel syndrome.  He is not working and because of his hands only has a mild partial temporary disability

## 2022-11-11 NOTE — HISTORY OF PRESENT ILLNESS
[de-identified] : 47-year-old male with numbness and tingling in the hands.  He is status post carpal tunnel release done a while ago.  He is complaining of some numbness in the fingers.  But is also reporting numbness in the little finger ring finger as well.

## 2022-12-20 ENCOUNTER — APPOINTMENT (OUTPATIENT)
Dept: ORTHOPEDIC SURGERY | Facility: CLINIC | Age: 47
End: 2022-12-20

## 2022-12-20 VITALS — BODY MASS INDEX: 33.59 KG/M2 | WEIGHT: 214 LBS | HEIGHT: 67 IN

## 2022-12-20 PROCEDURE — 99213 OFFICE O/P EST LOW 20 MIN: CPT

## 2022-12-20 PROCEDURE — 99072 ADDL SUPL MATRL&STAF TM PHE: CPT

## 2022-12-20 NOTE — PHYSICAL EXAM
[de-identified] : Patient has positive Tinel's meter compression test bilaterally he has good thenar strength without thenar atrophy bilaterally.  He has no intrinsic wasting.  He has normal capillary refill.  Positive flexion supination test bilaterally

## 2022-12-20 NOTE — ASSESSMENT
[FreeTextEntry1] : The patient is currently not working.  Of his hands only he has a mild partial tempers disability.  He will see us back in 6 weeks.  Asking for Workmen's Compensation authorization for bilateral upper extremity EMG

## 2022-12-20 NOTE — HISTORY OF PRESENT ILLNESS
[de-identified] : 47-year-old male who has a bilateral carpal tunnel syndrome and cubital tunnel syndrome.  He did have carpal tunnel release surgery that was work-related carpal tunnel release surgery done he has a recurrence of numbness and tingling in the fingers.  He requires a new EMG test.

## 2023-01-02 ENCOUNTER — FORM ENCOUNTER (OUTPATIENT)
Age: 48
End: 2023-01-02

## 2023-01-31 ENCOUNTER — APPOINTMENT (OUTPATIENT)
Dept: ORTHOPEDIC SURGERY | Facility: CLINIC | Age: 48
End: 2023-01-31
Payer: OTHER MISCELLANEOUS

## 2023-01-31 VITALS — BODY MASS INDEX: 33.59 KG/M2 | WEIGHT: 214 LBS | HEIGHT: 67 IN

## 2023-01-31 PROCEDURE — 99072 ADDL SUPL MATRL&STAF TM PHE: CPT

## 2023-01-31 PROCEDURE — 99213 OFFICE O/P EST LOW 20 MIN: CPT

## 2023-01-31 NOTE — HISTORY OF PRESENT ILLNESS
[de-identified] : 47-year-old male had his EMG test done we are trying to get the results of that test he is still not working but still complaining about numbness and tingling in both hands.  Because of his issues with his hands he has a mild partial temporary disability.

## 2023-01-31 NOTE — ASSESSMENT
[FreeTextEntry1] : Patient had carpal tunnel surgery already.  He has symptoms again which looks like it is a recurrence of his carpal tunnel syndrome.  He is probably going to require revision carpal tunnel release.  He is also complaining of numbness in the ulnar nerve distribution may require cubital tunnel decompression possible transposition.  We are getting the results of the EMG test and will contact him back.  See me in 6 weeks

## 2023-01-31 NOTE — PHYSICAL EXAM
[de-identified] : Patient has a positive Tinel's sign bilaterally.  Patient has positive median nerve compression test bilaterally.  He has good range of motion to the fingers wrist and hand.  He has good range of motion to the elbow.  He has a positive Tinel's across both elbows.  Normal capillary refill

## 2023-02-06 ENCOUNTER — NON-APPOINTMENT (OUTPATIENT)
Age: 48
End: 2023-02-06

## 2023-02-20 ENCOUNTER — FORM ENCOUNTER (OUTPATIENT)
Age: 48
End: 2023-02-20

## 2023-03-08 ENCOUNTER — OUTPATIENT (OUTPATIENT)
Dept: OUTPATIENT SERVICES | Facility: HOSPITAL | Age: 48
LOS: 1 days | End: 2023-03-08
Payer: COMMERCIAL

## 2023-03-08 ENCOUNTER — RESULT REVIEW (OUTPATIENT)
Age: 48
End: 2023-03-08

## 2023-03-08 VITALS
WEIGHT: 214.73 LBS | HEIGHT: 70 IN | RESPIRATION RATE: 14 BRPM | TEMPERATURE: 98 F | SYSTOLIC BLOOD PRESSURE: 122 MMHG | HEART RATE: 68 BPM | DIASTOLIC BLOOD PRESSURE: 78 MMHG | OXYGEN SATURATION: 97 %

## 2023-03-08 DIAGNOSIS — Z98.890 OTHER SPECIFIED POSTPROCEDURAL STATES: Chronic | ICD-10-CM

## 2023-03-08 DIAGNOSIS — G56.01 CARPAL TUNNEL SYNDROME, RIGHT UPPER LIMB: ICD-10-CM

## 2023-03-08 DIAGNOSIS — Z96.7 PRESENCE OF OTHER BONE AND TENDON IMPLANTS: Chronic | ICD-10-CM

## 2023-03-08 DIAGNOSIS — Z01.818 ENCOUNTER FOR OTHER PREPROCEDURAL EXAMINATION: ICD-10-CM

## 2023-03-08 LAB
ALBUMIN SERPL ELPH-MCNC: 4.5 G/DL — SIGNIFICANT CHANGE UP (ref 3.5–5.2)
ALP SERPL-CCNC: 83 U/L — SIGNIFICANT CHANGE UP (ref 30–115)
ALT FLD-CCNC: 14 U/L — SIGNIFICANT CHANGE UP (ref 0–41)
ANION GAP SERPL CALC-SCNC: 13 MMOL/L — SIGNIFICANT CHANGE UP (ref 7–14)
APTT BLD: 34.5 SEC — SIGNIFICANT CHANGE UP (ref 27–39.2)
AST SERPL-CCNC: 19 U/L — SIGNIFICANT CHANGE UP (ref 0–41)
BASOPHILS # BLD AUTO: 0.08 K/UL — SIGNIFICANT CHANGE UP (ref 0–0.2)
BASOPHILS NFR BLD AUTO: 1.2 % — HIGH (ref 0–1)
BILIRUB SERPL-MCNC: 0.6 MG/DL — SIGNIFICANT CHANGE UP (ref 0.2–1.2)
BUN SERPL-MCNC: 17 MG/DL — SIGNIFICANT CHANGE UP (ref 10–20)
CALCIUM SERPL-MCNC: 9.5 MG/DL — SIGNIFICANT CHANGE UP (ref 8.4–10.5)
CHLORIDE SERPL-SCNC: 100 MMOL/L — SIGNIFICANT CHANGE UP (ref 98–110)
CO2 SERPL-SCNC: 27 MMOL/L — SIGNIFICANT CHANGE UP (ref 17–32)
CREAT SERPL-MCNC: 0.8 MG/DL — SIGNIFICANT CHANGE UP (ref 0.7–1.5)
EGFR: 111 ML/MIN/1.73M2 — SIGNIFICANT CHANGE UP
EOSINOPHIL # BLD AUTO: 0.18 K/UL — SIGNIFICANT CHANGE UP (ref 0–0.7)
EOSINOPHIL NFR BLD AUTO: 2.7 % — SIGNIFICANT CHANGE UP (ref 0–8)
GLUCOSE SERPL-MCNC: 71 MG/DL — SIGNIFICANT CHANGE UP (ref 70–99)
HCT VFR BLD CALC: 40.9 % — LOW (ref 42–52)
HGB BLD-MCNC: 13.8 G/DL — LOW (ref 14–18)
IMM GRANULOCYTES NFR BLD AUTO: 0.3 % — SIGNIFICANT CHANGE UP (ref 0.1–0.3)
INR BLD: 1.11 RATIO — SIGNIFICANT CHANGE UP (ref 0.65–1.3)
LYMPHOCYTES # BLD AUTO: 2.64 K/UL — SIGNIFICANT CHANGE UP (ref 1.2–3.4)
LYMPHOCYTES # BLD AUTO: 39 % — SIGNIFICANT CHANGE UP (ref 20.5–51.1)
MCHC RBC-ENTMCNC: 29.4 PG — SIGNIFICANT CHANGE UP (ref 27–31)
MCHC RBC-ENTMCNC: 33.7 G/DL — SIGNIFICANT CHANGE UP (ref 32–37)
MCV RBC AUTO: 87.2 FL — SIGNIFICANT CHANGE UP (ref 80–94)
MONOCYTES # BLD AUTO: 0.32 K/UL — SIGNIFICANT CHANGE UP (ref 0.1–0.6)
MONOCYTES NFR BLD AUTO: 4.7 % — SIGNIFICANT CHANGE UP (ref 1.7–9.3)
NEUTROPHILS # BLD AUTO: 3.53 K/UL — SIGNIFICANT CHANGE UP (ref 1.4–6.5)
NEUTROPHILS NFR BLD AUTO: 52.1 % — SIGNIFICANT CHANGE UP (ref 42.2–75.2)
NRBC # BLD: 0 /100 WBCS — SIGNIFICANT CHANGE UP (ref 0–0)
PLATELET # BLD AUTO: 212 K/UL — SIGNIFICANT CHANGE UP (ref 130–400)
POTASSIUM SERPL-MCNC: 4.3 MMOL/L — SIGNIFICANT CHANGE UP (ref 3.5–5)
POTASSIUM SERPL-SCNC: 4.3 MMOL/L — SIGNIFICANT CHANGE UP (ref 3.5–5)
PROT SERPL-MCNC: 7 G/DL — SIGNIFICANT CHANGE UP (ref 6–8)
PROTHROM AB SERPL-ACNC: 12.7 SEC — SIGNIFICANT CHANGE UP (ref 9.95–12.87)
RBC # BLD: 4.69 M/UL — LOW (ref 4.7–6.1)
RBC # FLD: 13.1 % — SIGNIFICANT CHANGE UP (ref 11.5–14.5)
SODIUM SERPL-SCNC: 140 MMOL/L — SIGNIFICANT CHANGE UP (ref 135–146)
WBC # BLD: 6.77 K/UL — SIGNIFICANT CHANGE UP (ref 4.8–10.8)
WBC # FLD AUTO: 6.77 K/UL — SIGNIFICANT CHANGE UP (ref 4.8–10.8)

## 2023-03-08 PROCEDURE — 36415 COLL VENOUS BLD VENIPUNCTURE: CPT

## 2023-03-08 PROCEDURE — 71046 X-RAY EXAM CHEST 2 VIEWS: CPT | Mod: 26

## 2023-03-08 PROCEDURE — 93010 ELECTROCARDIOGRAM REPORT: CPT

## 2023-03-08 PROCEDURE — 99214 OFFICE O/P EST MOD 30 MIN: CPT | Mod: 25

## 2023-03-08 PROCEDURE — 85730 THROMBOPLASTIN TIME PARTIAL: CPT

## 2023-03-08 PROCEDURE — 93005 ELECTROCARDIOGRAM TRACING: CPT

## 2023-03-08 PROCEDURE — 80053 COMPREHEN METABOLIC PANEL: CPT

## 2023-03-08 PROCEDURE — 71046 X-RAY EXAM CHEST 2 VIEWS: CPT

## 2023-03-08 PROCEDURE — 85610 PROTHROMBIN TIME: CPT

## 2023-03-08 PROCEDURE — 85025 COMPLETE CBC W/AUTO DIFF WBC: CPT

## 2023-03-08 NOTE — H&P PST ADULT - HISTORY OF PRESENT ILLNESS
PT PRESENTS TO PAST WITH NO SOB, CP, PALPITATIONS, DYSURIA, UTI OR URI AT PRESENT.   PT ABLE TO WALK UP 2-3 FLIGHTS OF STEPS WITH NO SOB. PT AMBULATES TO [facility] USING A CANE.   AS PER THE PT, THIS IS HIS/HER COMPLETE MEDICAL AND SURGICAL HX, INCLUDING MEDICATIONS PRESCRIBED AND OVER THE COUNTER  pt denies any covid s/s, 1 YR AGO-- tested positive in the past  pt advised self quarantine till day of procedure  denies travel outside the [place] in the past 30 days  Anesthesia Alert  NO--Difficult Airway  NO--History of neck surgery or radiation  NO--Limited ROM of neck  NO--History of Malignant hyperthermia  NO--Personal or family history of Pseudocholinesterase deficiency  NO--Prior Anesthesia Complication  NO--Latex Allergy  NO--Loose teeth  NO--History of Rheumatoid Arthritis  YES --ABI  NO BLEEDING RISK  NO--Other_____

## 2023-03-08 NOTE — H&P PST ADULT - ADDITIONAL PE
AIRWAY CLASS-3  PT DENIES HAVING ANY LOOSE TEETH   PT C/O PAIN WITH- FLEXION AND EXTENSION OF RIGHT ARM.
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
## 2023-03-09 DIAGNOSIS — G56.01 CARPAL TUNNEL SYNDROME, RIGHT UPPER LIMB: ICD-10-CM

## 2023-03-09 DIAGNOSIS — Z01.818 ENCOUNTER FOR OTHER PREPROCEDURAL EXAMINATION: ICD-10-CM

## 2023-03-14 ENCOUNTER — APPOINTMENT (OUTPATIENT)
Dept: SURGERY | Facility: CLINIC | Age: 48
End: 2023-03-14
Payer: MEDICARE

## 2023-03-14 ENCOUNTER — APPOINTMENT (OUTPATIENT)
Dept: ORTHOPEDIC SURGERY | Facility: CLINIC | Age: 48
End: 2023-03-14
Payer: OTHER MISCELLANEOUS

## 2023-03-14 VITALS
HEIGHT: 67 IN | WEIGHT: 199 LBS | OXYGEN SATURATION: 99 % | SYSTOLIC BLOOD PRESSURE: 122 MMHG | DIASTOLIC BLOOD PRESSURE: 80 MMHG | HEART RATE: 97 BPM | BODY MASS INDEX: 31.23 KG/M2 | TEMPERATURE: 95.5 F

## 2023-03-14 VITALS — HEIGHT: 67 IN | BODY MASS INDEX: 33.59 KG/M2 | WEIGHT: 214 LBS

## 2023-03-14 DIAGNOSIS — G47.33 OBSTRUCTIVE SLEEP APNEA (ADULT) (PEDIATRIC): ICD-10-CM

## 2023-03-14 PROCEDURE — 99213 OFFICE O/P EST LOW 20 MIN: CPT

## 2023-03-14 PROCEDURE — 99072 ADDL SUPL MATRL&STAF TM PHE: CPT

## 2023-03-14 NOTE — ASSESSMENT
[FreeTextEntry1] : Patient has carpal tunnel syndrome recurrent on the right side as well as cubital tunnel syndrome on the right side he is set for surgery for revision carpal tunnel release as well as a cubital tunnel decompression possible transposition.  Risk and benefits of surgery, limited to bleeding infection risk injury stiffness continued numbness discussed again.

## 2023-03-14 NOTE — PLAN
[FreeTextEntry1] : Plan: Continue with behavioral changes.\par          Blood work.\par          RTO in 6 months.\par          Guillermo with concerns.

## 2023-03-14 NOTE — PHYSICAL EXAM
[de-identified] : Patient has positive Tinel's meter compression test on the right side.  Well-healed previous surgical skin incision.  He has positive flexion supination test and tenderness to palpation on the medial epicondyle with positive Tinel's at the elbow

## 2023-03-14 NOTE — PHYSICAL EXAM
[Normal] : normoactive bowel sounds, soft and nontender, no hepatosplenomegaly or masses appreciated. Incisions healing appropriately without erythema or drainage [de-identified] : Soft, nondistended

## 2023-03-14 NOTE — ASSESSMENT
[FreeTextEntry1] : RIGOBERTO LAKE is a 47 year male seen today for bariatric follow up visit. Patient is doing well with his weight loss goals.\par Breakfast is usually a protein shake or eggs\par Lunch - chicken or tuna fish, turkey ground beef with chili seasoning.\par Dinner - protein bar/shake if he is not very hungry or chicken/shrimp/fish with a small serving of mashed potato with spinach, turkey chili, beef stew. He finds that broccoli and salads makes him very gassy.\par He eats some fruits.\par Fluid intake is adequate with water, and warm teas.\par He is walking 1 hour every day even though he ambulates with a cane and he added light weights.\par \par  \par

## 2023-03-14 NOTE — HISTORY OF PRESENT ILLNESS
[de-identified] : Patient had surgery approximately 1 1/2 years ago. He feels great and is deriving benefits from his weight loss. \par Denies reflux/heartburn/nausea.\par Taking 2 multivitamins with iron, 2 Viactiv chews, 1 vitamin b 12 and daily.\par He has not been using his CPAP machine but sleeps throughout the night and wakes up refreshed.plans to follow up with pulmonary. Back and knee pain improved. Ambulates with a cane. He plans to have hand surgery at the end of the month.\par

## 2023-03-14 NOTE — HISTORY OF PRESENT ILLNESS
[de-identified] : .  47-year-old male with numbness and tingling in both hands.  Is not working.  He is still with a mild partial tempers ability he is still reporting the tingling and pain discomfort in the right hand.  He is scheduled for surgery for the right arm.

## 2023-03-24 ENCOUNTER — APPOINTMENT (OUTPATIENT)
Dept: ORTHOPEDIC SURGERY | Facility: CLINIC | Age: 48
End: 2023-03-24

## 2023-03-26 ENCOUNTER — LABORATORY RESULT (OUTPATIENT)
Age: 48
End: 2023-03-26

## 2023-03-28 NOTE — ASU PATIENT PROFILE, ADULT - FALL HARM RISK - HARM RISK INTERVENTIONS

## 2023-03-28 NOTE — ASU PATIENT PROFILE, ADULT - NSICDXPASTSURGICALHX_GEN_ALL_CORE_FT
PAST SURGICAL HISTORY:  H/O arthroscopy of knee RT -2017 -SPINAL    H/O gastric bypass 9/2021    History of carpal tunnel release of both wrists 06/2018 RIght    S/P ORIF (open reduction internal fixation) fracture LT ANKLE

## 2023-03-29 ENCOUNTER — OUTPATIENT (OUTPATIENT)
Dept: INPATIENT UNIT | Facility: HOSPITAL | Age: 48
LOS: 1 days | Discharge: HOME | End: 2023-03-29
Payer: COMMERCIAL

## 2023-03-29 ENCOUNTER — APPOINTMENT (OUTPATIENT)
Dept: ORTHOPEDIC SURGERY | Facility: AMBULATORY SURGERY CENTER | Age: 48
End: 2023-03-29
Payer: OTHER MISCELLANEOUS

## 2023-03-29 VITALS
SYSTOLIC BLOOD PRESSURE: 115 MMHG | DIASTOLIC BLOOD PRESSURE: 69 MMHG | RESPIRATION RATE: 15 BRPM | TEMPERATURE: 99 F | HEART RATE: 58 BPM | OXYGEN SATURATION: 97 %

## 2023-03-29 VITALS
HEART RATE: 53 BPM | SYSTOLIC BLOOD PRESSURE: 114 MMHG | DIASTOLIC BLOOD PRESSURE: 81 MMHG | OXYGEN SATURATION: 99 % | TEMPERATURE: 99 F | RESPIRATION RATE: 13 BRPM

## 2023-03-29 DIAGNOSIS — Z96.7 PRESENCE OF OTHER BONE AND TENDON IMPLANTS: Chronic | ICD-10-CM

## 2023-03-29 DIAGNOSIS — Z98.890 OTHER SPECIFIED POSTPROCEDURAL STATES: Chronic | ICD-10-CM

## 2023-03-29 DIAGNOSIS — G56.01 CARPAL TUNNEL SYNDROME, RIGHT UPPER LIMB: ICD-10-CM

## 2023-03-29 DIAGNOSIS — Z98.84 BARIATRIC SURGERY STATUS: Chronic | ICD-10-CM

## 2023-03-29 DIAGNOSIS — G56.21 LESION OF ULNAR NERVE, RIGHT UPPER LIMB: ICD-10-CM

## 2023-03-29 PROCEDURE — C9399: CPT

## 2023-03-29 PROCEDURE — 64718 REVISE ULNAR NERVE AT ELBOW: CPT | Mod: RT

## 2023-03-29 PROCEDURE — 64721 CARPAL TUNNEL SURGERY: CPT | Mod: RT

## 2023-03-29 RX ORDER — ACETAMINOPHEN 500 MG
2 TABLET ORAL
Qty: 0 | Refills: 0 | DISCHARGE

## 2023-03-29 RX ORDER — GABAPENTIN 400 MG/1
1 CAPSULE ORAL
Qty: 0 | Refills: 0 | DISCHARGE

## 2023-03-29 RX ORDER — TRAMADOL HYDROCHLORIDE 50 MG/1
1 TABLET ORAL
Qty: 0 | Refills: 0 | DISCHARGE

## 2023-03-29 RX ORDER — DICLOFENAC SODIUM 75 MG/1
1 TABLET, DELAYED RELEASE ORAL
Qty: 0 | Refills: 0 | DISCHARGE

## 2023-03-29 RX ORDER — TIZANIDINE 4 MG/1
2 TABLET ORAL
Qty: 0 | Refills: 0 | DISCHARGE

## 2023-03-29 RX ORDER — MELOXICAM 15 MG/1
1 TABLET ORAL
Qty: 0 | Refills: 0 | DISCHARGE

## 2023-03-29 RX ORDER — CELECOXIB 200 MG/1
1 CAPSULE ORAL
Qty: 0 | Refills: 0 | DISCHARGE

## 2023-03-29 RX ORDER — ASPIRIN/CALCIUM CARB/MAGNESIUM 324 MG
1 TABLET ORAL
Qty: 0 | Refills: 0 | DISCHARGE

## 2023-03-29 RX ORDER — BACLOFEN 100 %
1 POWDER (GRAM) MISCELLANEOUS
Qty: 0 | Refills: 0 | DISCHARGE

## 2023-03-29 RX ORDER — OXYCODONE AND ACETAMINOPHEN 5; 325 MG/1; MG/1
5-325 TABLET ORAL
Qty: 20 | Refills: 0 | Status: ACTIVE | COMMUNITY
Start: 2023-03-29 | End: 1900-01-01

## 2023-03-29 RX ORDER — LOSARTAN POTASSIUM 100 MG/1
1 TABLET, FILM COATED ORAL
Qty: 0 | Refills: 0 | DISCHARGE

## 2023-04-03 DIAGNOSIS — G56.01 CARPAL TUNNEL SYNDROME, RIGHT UPPER LIMB: ICD-10-CM

## 2023-04-03 DIAGNOSIS — G56.21 LESION OF ULNAR NERVE, RIGHT UPPER LIMB: ICD-10-CM

## 2023-04-03 DIAGNOSIS — E66.01 MORBID (SEVERE) OBESITY DUE TO EXCESS CALORIES: ICD-10-CM

## 2023-04-03 DIAGNOSIS — F17.210 NICOTINE DEPENDENCE, CIGARETTES, UNCOMPLICATED: ICD-10-CM

## 2023-04-03 DIAGNOSIS — G47.33 OBSTRUCTIVE SLEEP APNEA (ADULT) (PEDIATRIC): ICD-10-CM

## 2023-04-03 DIAGNOSIS — I10 ESSENTIAL (PRIMARY) HYPERTENSION: ICD-10-CM

## 2023-04-03 DIAGNOSIS — J44.9 CHRONIC OBSTRUCTIVE PULMONARY DISEASE, UNSPECIFIED: ICD-10-CM

## 2023-04-06 ENCOUNTER — APPOINTMENT (OUTPATIENT)
Dept: ORTHOPEDIC SURGERY | Facility: CLINIC | Age: 48
End: 2023-04-06
Payer: OTHER MISCELLANEOUS

## 2023-04-06 PROCEDURE — 99024 POSTOP FOLLOW-UP VISIT: CPT

## 2023-04-06 NOTE — HISTORY OF PRESENT ILLNESS
[de-identified] :  patient is a 47-year-old male here status post right carpal tunnel release and right cubital tunnel decompression on 03/29/2023.  Patient reports he is doing well and has no complaints at this time.  Denies any pain /numbness /tingling at this time.

## 2023-04-06 NOTE — PHYSICAL EXAM
[de-identified] :   Physical examination of the right elbow:  Postoperative wounds clean dry and intact.  Sutures are intact.  No active bleeding / discharge at this time.  Mild swelling appreciated over the postoperative wound.  Patient has full range of motion of the elbow upon flexion, extension, and supination /pronation without any limitations.\par \par   Physical examination of the right hand: Postoperative wound  Is  Dry and intact.  No active bleeding or discharge. mild swelling appreciated over the postoperative wound.  Sutures are intact.  Patient has full range of motion of the hand without any limitations.  He is a able to make a full fist at this time.

## 2023-05-22 ENCOUNTER — APPOINTMENT (OUTPATIENT)
Dept: ORTHOPEDIC SURGERY | Facility: CLINIC | Age: 48
End: 2023-05-22
Payer: OTHER MISCELLANEOUS

## 2023-05-22 VITALS — BODY MASS INDEX: 31.23 KG/M2 | HEIGHT: 67 IN | WEIGHT: 199 LBS

## 2023-05-22 PROBLEM — M19.90 UNSPECIFIED OSTEOARTHRITIS, UNSPECIFIED SITE: Chronic | Status: ACTIVE | Noted: 2018-05-30

## 2023-05-22 PROCEDURE — 99024 POSTOP FOLLOW-UP VISIT: CPT

## 2023-05-22 NOTE — PHYSICAL EXAM
[de-identified] : Patient is well-healed surgical skin incisions.  The swelling present along the wrist area on the ulnar side of the skin incision motion to the fingers.

## 2023-05-22 NOTE — ASSESSMENT
[FreeTextEntry1] : Patient status post revision carpal tunnel release surgery and cubital tunnel decompression.  Patient will see us back in 6 weeks.  I am enrolling the patient into a therapy program.  He currently is not working and is totally disabled.  He is in the acute postsurgical time.

## 2023-05-22 NOTE — HISTORY OF PRESENT ILLNESS
[de-identified] : 47-year-old male status post revision carpal tunnel release on the right side along with cubital tunnel decompression.  He is in the office for evaluation today is feeling much better in terms of the fingers he has a little weak in the hand but he feels much better with the numbness and tingling.

## 2023-06-28 NOTE — ASU PATIENT PROFILE, ADULT - PROVIDER NOTIFICATION
Declines Olanzapine Counseling- I discussed with the patient the common side effects of olanzapine including but are not limited to: lack of energy, dry mouth, increased appetite, sleepiness, tremor, constipation, dizziness, changes in behavior, or restlessness.  Explained that teenagers are more likely to experience headaches, abdominal pain, pain in the arms or legs, tiredness, and sleepiness.  Serious side effects include but are not limited: increased risk of death in elderly patients who are confused, have memory loss, or dementia-related psychosis; hyperglycemia; increased cholesterol and triglycerides; and weight gain.

## 2023-07-10 ENCOUNTER — APPOINTMENT (OUTPATIENT)
Dept: ORTHOPEDIC SURGERY | Facility: CLINIC | Age: 48
End: 2023-07-10
Payer: OTHER MISCELLANEOUS

## 2023-07-10 VITALS — HEIGHT: 67 IN | WEIGHT: 199 LBS | BODY MASS INDEX: 31.23 KG/M2

## 2023-07-10 PROCEDURE — 99213 OFFICE O/P EST LOW 20 MIN: CPT

## 2023-07-10 NOTE — ASSESSMENT
[FreeTextEntry1] : Patient will begin an occupational therapy program for his right elbow and hand will see me back in 6 weeks.  No further surgeries are necessary at this time.

## 2023-07-10 NOTE — HISTORY OF PRESENT ILLNESS
[de-identified] : 48-year-old male status post right-sided cubital tunnel decompression.  He is feeling better from a numbness and tingling standpoint hand.  He has not begun a therapy program and he comes in for the evaluation today.  He is still totally disabled in the acute postoperative time.

## 2023-07-13 NOTE — ASU DISCHARGE PLAN (ADULT/PEDIATRIC). - MODE OF TRANSPORTATION
Pt with complaints of worsening CP. Dr Pop at bedside. PIV and labs completed. Pt brought to CC. Pt A&O x 2(baseline) reports relief in symptoms. Reports tolerable pain. Airway patent. Respirations even and unlabored. No JVD or diaphoresis. Pt remains on telemonitor. Bed locked and in lowest position. IV fluids running. Frequent checks made. Wheelchair/Stroller

## 2023-07-24 ENCOUNTER — FORM ENCOUNTER (OUTPATIENT)
Age: 48
End: 2023-07-24

## 2023-08-21 ENCOUNTER — APPOINTMENT (OUTPATIENT)
Dept: ORTHOPEDIC SURGERY | Facility: CLINIC | Age: 48
End: 2023-08-21
Payer: OTHER MISCELLANEOUS

## 2023-08-21 VITALS — BODY MASS INDEX: 29.82 KG/M2 | WEIGHT: 190 LBS | HEIGHT: 67 IN

## 2023-08-21 PROCEDURE — 99213 OFFICE O/P EST LOW 20 MIN: CPT

## 2023-08-21 NOTE — HISTORY OF PRESENT ILLNESS
[de-identified] : Patient status post cubital tunnel decompression and carpal tunnel release revision.  He is doing better he was unable to get into therapy.  He is with a mild partial temporary disability.  He is not working.

## 2023-08-21 NOTE — PHYSICAL EXAM
[de-identified] : Patient has well-healed surgical skin incisions.  Good range of motion of the elbow wrist and hand.  Normal capillary refill.

## 2023-09-18 ENCOUNTER — APPOINTMENT (OUTPATIENT)
Dept: SURGERY | Facility: CLINIC | Age: 48
End: 2023-09-18
Payer: MEDICARE

## 2023-09-18 VITALS
BODY MASS INDEX: 34.21 KG/M2 | WEIGHT: 218 LBS | SYSTOLIC BLOOD PRESSURE: 132 MMHG | HEIGHT: 67 IN | DIASTOLIC BLOOD PRESSURE: 74 MMHG

## 2023-09-18 DIAGNOSIS — M54.50 LOW BACK PAIN, UNSPECIFIED: ICD-10-CM

## 2023-09-18 DIAGNOSIS — E66.9 OBESITY, UNSPECIFIED: ICD-10-CM

## 2023-09-18 DIAGNOSIS — F32.A DEPRESSION, UNSPECIFIED: ICD-10-CM

## 2023-09-18 PROCEDURE — 99213 OFFICE O/P EST LOW 20 MIN: CPT

## 2023-10-03 ENCOUNTER — APPOINTMENT (OUTPATIENT)
Dept: ORTHOPEDIC SURGERY | Facility: CLINIC | Age: 48
End: 2023-10-03
Payer: OTHER MISCELLANEOUS

## 2023-10-03 VITALS — HEIGHT: 67 IN | BODY MASS INDEX: 34.21 KG/M2 | WEIGHT: 218 LBS

## 2023-10-03 PROCEDURE — 99213 OFFICE O/P EST LOW 20 MIN: CPT

## 2023-10-26 ENCOUNTER — NON-APPOINTMENT (OUTPATIENT)
Age: 48
End: 2023-10-26

## 2023-11-14 ENCOUNTER — APPOINTMENT (OUTPATIENT)
Dept: ORTHOPEDIC SURGERY | Facility: CLINIC | Age: 48
End: 2023-11-14
Payer: OTHER MISCELLANEOUS

## 2023-11-14 PROCEDURE — 99213 OFFICE O/P EST LOW 20 MIN: CPT

## 2023-12-04 NOTE — H&P PST ADULT - TEACHING/LEARNING CULTURAL CONSIDERATIONS
- patient with CT evidence of aspiration pneumonia likely due to severe reflux secondary to pt having no esophageal sphincter   - continue Unasyn  - blood cultures ordered, f/u results  - pulmonology consulted (Dr. Arteaga), recs appreciated  - S&S evaluated, regular diet during the day, NPO at midnight for MBS evaluation.  - d/c IV fluids  - monitor oxygen, saturating well on room air. supplement as needed none

## 2023-12-05 NOTE — PHYSICAL EXAM
[de-identified] : Patient is good range of motion to the right forearm and hand.  He has well-healed medial based skin incisions he has no swelling erythema ecchymoses or abrasions. Attending Attestation (For Attendings USE Only)...

## 2023-12-22 ENCOUNTER — APPOINTMENT (OUTPATIENT)
Dept: ORTHOPEDIC SURGERY | Facility: CLINIC | Age: 48
End: 2023-12-22
Payer: OTHER MISCELLANEOUS

## 2023-12-22 PROCEDURE — 99213 OFFICE O/P EST LOW 20 MIN: CPT

## 2023-12-22 NOTE — PHYSICAL EXAM
[de-identified] : Patient has well-healed surgical skin incisions.  Patient has no intrinsic wasting.  There is no erythema ecchymoses or abrasions.

## 2023-12-22 NOTE — HISTORY OF PRESENT ILLNESS
[de-identified] : 48-year-old male status post right carpal tunnel release and cubital tunnel decompression 6 months ago.  He has a bit more numbness today than in the recent past.  He is feeling well.  He feels improved since surgery.  He is not working.  Because of his hands only has a mild partial temporary disability.

## 2023-12-22 NOTE — ASSESSMENT
[FreeTextEntry1] : Patient status post right revision carpal tunnel release and cubital tunnel decompression.  He is doing well.  Will see us back in 6 weeks.  Not asking for any further diagnostic testing or treatment for his right hand.  He will be kept under observation.

## 2024-02-29 ENCOUNTER — INPATIENT (INPATIENT)
Facility: HOSPITAL | Age: 49
LOS: 5 days | Discharge: HOME | DRG: 417 | End: 2024-03-06
Attending: STUDENT IN AN ORGANIZED HEALTH CARE EDUCATION/TRAINING PROGRAM | Admitting: STUDENT IN AN ORGANIZED HEALTH CARE EDUCATION/TRAINING PROGRAM
Payer: MEDICARE

## 2024-02-29 VITALS
SYSTOLIC BLOOD PRESSURE: 135 MMHG | OXYGEN SATURATION: 98 % | RESPIRATION RATE: 18 BRPM | HEART RATE: 88 BPM | DIASTOLIC BLOOD PRESSURE: 85 MMHG | WEIGHT: 250 LBS

## 2024-02-29 DIAGNOSIS — Z96.7 PRESENCE OF OTHER BONE AND TENDON IMPLANTS: Chronic | ICD-10-CM

## 2024-02-29 DIAGNOSIS — Z98.84 BARIATRIC SURGERY STATUS: Chronic | ICD-10-CM

## 2024-02-29 DIAGNOSIS — Z98.890 OTHER SPECIFIED POSTPROCEDURAL STATES: Chronic | ICD-10-CM

## 2024-02-29 LAB
ALBUMIN SERPL ELPH-MCNC: 4.2 G/DL — SIGNIFICANT CHANGE UP (ref 3.5–5.2)
ALP SERPL-CCNC: 193 U/L — HIGH (ref 30–115)
ALT FLD-CCNC: 264 U/L — HIGH (ref 0–41)
ANION GAP SERPL CALC-SCNC: 13 MMOL/L — SIGNIFICANT CHANGE UP (ref 7–14)
APPEARANCE UR: ABNORMAL
AST SERPL-CCNC: 91 U/L — HIGH (ref 0–41)
BASOPHILS # BLD AUTO: 0.04 K/UL — SIGNIFICANT CHANGE UP (ref 0–0.2)
BASOPHILS NFR BLD AUTO: 0.3 % — SIGNIFICANT CHANGE UP (ref 0–1)
BILIRUB SERPL-MCNC: 4.1 MG/DL — HIGH (ref 0.2–1.2)
BILIRUB UR-MCNC: ABNORMAL
BUN SERPL-MCNC: 17 MG/DL — SIGNIFICANT CHANGE UP (ref 10–20)
CALCIUM SERPL-MCNC: 9.5 MG/DL — SIGNIFICANT CHANGE UP (ref 8.4–10.5)
CHLORIDE SERPL-SCNC: 101 MMOL/L — SIGNIFICANT CHANGE UP (ref 98–110)
CO2 SERPL-SCNC: 27 MMOL/L — SIGNIFICANT CHANGE UP (ref 17–32)
COLOR SPEC: SIGNIFICANT CHANGE UP
CREAT SERPL-MCNC: 1.1 MG/DL — SIGNIFICANT CHANGE UP (ref 0.7–1.5)
DIFF PNL FLD: NEGATIVE — SIGNIFICANT CHANGE UP
EGFR: 83 ML/MIN/1.73M2 — SIGNIFICANT CHANGE UP
EOSINOPHIL # BLD AUTO: 0.06 K/UL — SIGNIFICANT CHANGE UP (ref 0–0.7)
EOSINOPHIL NFR BLD AUTO: 0.5 % — SIGNIFICANT CHANGE UP (ref 0–8)
GLUCOSE SERPL-MCNC: 128 MG/DL — HIGH (ref 70–99)
GLUCOSE UR QL: NEGATIVE MG/DL — SIGNIFICANT CHANGE UP
HCT VFR BLD CALC: 44 % — SIGNIFICANT CHANGE UP (ref 42–52)
HGB BLD-MCNC: 15.2 G/DL — SIGNIFICANT CHANGE UP (ref 14–18)
IMM GRANULOCYTES NFR BLD AUTO: 0.4 % — HIGH (ref 0.1–0.3)
KETONES UR-MCNC: ABNORMAL MG/DL
LEUKOCYTE ESTERASE UR-ACNC: ABNORMAL
LIDOCAIN IGE QN: >3000 U/L — HIGH (ref 7–60)
LYMPHOCYTES # BLD AUTO: 0.86 K/UL — LOW (ref 1.2–3.4)
LYMPHOCYTES # BLD AUTO: 6.7 % — LOW (ref 20.5–51.1)
MCHC RBC-ENTMCNC: 29.7 PG — SIGNIFICANT CHANGE UP (ref 27–31)
MCHC RBC-ENTMCNC: 34.5 G/DL — SIGNIFICANT CHANGE UP (ref 32–37)
MCV RBC AUTO: 86.1 FL — SIGNIFICANT CHANGE UP (ref 80–94)
MONOCYTES # BLD AUTO: 0.67 K/UL — HIGH (ref 0.1–0.6)
MONOCYTES NFR BLD AUTO: 5.2 % — SIGNIFICANT CHANGE UP (ref 1.7–9.3)
NEUTROPHILS # BLD AUTO: 11.22 K/UL — HIGH (ref 1.4–6.5)
NEUTROPHILS NFR BLD AUTO: 86.9 % — HIGH (ref 42.2–75.2)
NITRITE UR-MCNC: POSITIVE
NRBC # BLD: 0 /100 WBCS — SIGNIFICANT CHANGE UP (ref 0–0)
PH UR: 5.5 — SIGNIFICANT CHANGE UP (ref 5–8)
PLATELET # BLD AUTO: 225 K/UL — SIGNIFICANT CHANGE UP (ref 130–400)
PMV BLD: 9.7 FL — SIGNIFICANT CHANGE UP (ref 7.4–10.4)
POTASSIUM SERPL-MCNC: 4.2 MMOL/L — SIGNIFICANT CHANGE UP (ref 3.5–5)
POTASSIUM SERPL-SCNC: 4.2 MMOL/L — SIGNIFICANT CHANGE UP (ref 3.5–5)
PROT SERPL-MCNC: 7 G/DL — SIGNIFICANT CHANGE UP (ref 6–8)
PROT UR-MCNC: SIGNIFICANT CHANGE UP MG/DL
RBC # BLD: 5.11 M/UL — SIGNIFICANT CHANGE UP (ref 4.7–6.1)
RBC # FLD: 12.6 % — SIGNIFICANT CHANGE UP (ref 11.5–14.5)
SODIUM SERPL-SCNC: 141 MMOL/L — SIGNIFICANT CHANGE UP (ref 135–146)
SP GR SPEC: 1.03 — SIGNIFICANT CHANGE UP (ref 1–1.03)
UROBILINOGEN FLD QL: 1 MG/DL — SIGNIFICANT CHANGE UP (ref 0.2–1)
WBC # BLD: 12.9 K/UL — HIGH (ref 4.8–10.8)
WBC # FLD AUTO: 12.9 K/UL — HIGH (ref 4.8–10.8)

## 2024-02-29 PROCEDURE — 74177 CT ABD & PELVIS W/CONTRAST: CPT | Mod: 26,MC

## 2024-02-29 PROCEDURE — 76705 ECHO EXAM OF ABDOMEN: CPT | Mod: 26

## 2024-02-29 PROCEDURE — 99285 EMERGENCY DEPT VISIT HI MDM: CPT

## 2024-02-29 RX ORDER — SODIUM CHLORIDE 9 MG/ML
1000 INJECTION INTRAMUSCULAR; INTRAVENOUS; SUBCUTANEOUS ONCE
Refills: 0 | Status: COMPLETED | OUTPATIENT
Start: 2024-02-29 | End: 2024-02-29

## 2024-02-29 RX ORDER — KETOROLAC TROMETHAMINE 30 MG/ML
15 SYRINGE (ML) INJECTION ONCE
Refills: 0 | Status: DISCONTINUED | OUTPATIENT
Start: 2024-02-29 | End: 2024-02-29

## 2024-02-29 RX ORDER — ONDANSETRON 8 MG/1
4 TABLET, FILM COATED ORAL ONCE
Refills: 0 | Status: COMPLETED | OUTPATIENT
Start: 2024-02-29 | End: 2024-02-29

## 2024-02-29 RX ORDER — IOHEXOL 300 MG/ML
30 INJECTION, SOLUTION INTRAVENOUS ONCE
Refills: 0 | Status: COMPLETED | OUTPATIENT
Start: 2024-02-29 | End: 2024-02-29

## 2024-02-29 RX ORDER — PIPERACILLIN AND TAZOBACTAM 4; .5 G/20ML; G/20ML
3.38 INJECTION, POWDER, LYOPHILIZED, FOR SOLUTION INTRAVENOUS ONCE
Refills: 0 | Status: COMPLETED | OUTPATIENT
Start: 2024-02-29 | End: 2024-02-29

## 2024-02-29 RX ADMIN — PIPERACILLIN AND TAZOBACTAM 200 GRAM(S): 4; .5 INJECTION, POWDER, LYOPHILIZED, FOR SOLUTION INTRAVENOUS at 22:57

## 2024-02-29 RX ADMIN — SODIUM CHLORIDE 1000 MILLILITER(S): 9 INJECTION INTRAMUSCULAR; INTRAVENOUS; SUBCUTANEOUS at 20:15

## 2024-02-29 RX ADMIN — SODIUM CHLORIDE 1000 MILLILITER(S): 9 INJECTION INTRAMUSCULAR; INTRAVENOUS; SUBCUTANEOUS at 22:43

## 2024-02-29 RX ADMIN — IOHEXOL 30 MILLILITER(S): 300 INJECTION, SOLUTION INTRAVENOUS at 20:56

## 2024-02-29 RX ADMIN — ONDANSETRON 4 MILLIGRAM(S): 8 TABLET, FILM COATED ORAL at 22:07

## 2024-02-29 RX ADMIN — Medication 15 MILLIGRAM(S): at 22:06

## 2024-02-29 RX ADMIN — Medication 15 MILLIGRAM(S): at 22:07

## 2024-02-29 NOTE — ED PROVIDER NOTE - CLINICAL SUMMARY MEDICAL DECISION MAKING FREE TEXT BOX
48-year-old male with history of gastric bypass presenting today with right upper quadrant abdominal pain, vomiting and subjective fevers x 1 day.  On exam patient is in acute distress secondary to right upper quad abdominal pain which is reproducible on exam.  Multiple episodes of vomiting in the ED.  Vitals initially stable.  Labs ordered and reviewed noted to have leukocytosis, transaminitis as well as significant elevation in lipase.  CT abdomen pelvis performed showing distended gallbladder, as well as lesions to pancreas.  Right upper quadrant ultrasound performed with sludge and gallbladder with no evidence of cholecystitis.  Patient given empiric antibiotics for positive nitrite urine as well as concern for colitis.  Surgery consult appreciated, admitted for further management. stable at the time of admission.

## 2024-02-29 NOTE — ED PROVIDER NOTE - OBJECTIVE STATEMENT
Patient is a 48-year-old male with past medical history of chronic pain presenting to the ED complaining of right flank pain.  Patient states for the past week he has been having increasing right-sided flank pain that radiates to the right lower quadrant.  Patient is also endorsing subjective fevers and hematuria.  This morning patient had multiple episodes of nonbloody nonbilious emesis.  Patient states he has never had episodes like this before. Patient is a 48-year-old male with past medical history of chronic pain presenting to the ED complaining of right flank pain.  Patient states for the past week he has been having increasing right-sided flank pain that radiates to the right lower quadrant and RUQ pain. Patient is also endorsing subjective fevers and hematuria.  This morning patient had multiple episodes of nonbloody nonbilious emesis.  Patient states he has never had episodes like this before.    Patient denies any sob, cp, ha, weakness, dizziness Patient is a 48-year-old male with past medical history of chronic pain and gastric bypass presenting to the ED complaining of right flank pain.  Patient states for the past week he has been having increasing right-sided flank pain that radiates to the right lower quadrant and RUQ pain. Patient is also endorsing subjective fevers and hematuria.  This morning patient had multiple episodes of nonbloody nonbilious emesis.  Patient states he has never had episodes like this before.    Patient denies any sob, cp, ha, weakness, dizziness

## 2024-02-29 NOTE — ED ADULT TRIAGE NOTE - CHIEF COMPLAINT QUOTE
pt BIBEMS from home for severe abdominal pain today, n/v/d, subjective fever, had gastric bypass 2 years, un able to get temp in triage as pt is actively vomiting/ heaving

## 2024-02-29 NOTE — ED PROVIDER NOTE - PHYSICAL EXAMINATION
CONSTITUTIONAL: in mild distress  SKIN: Warm dry, normal skin turgor  HEAD: NCAT  EYES: EOMI, PERRLA, no scleral icterus, conjunctiva pink  ENT: normal pharynx with no erythema or exudates  NECK: Supple; non tender. Full ROM.  CARD: RRR,   RESP: clear to ausculation b/l. No crackles or wheezing.  ABD: Tenderness to palpation RUQ, +R CVA tenderness, Tenderness to palpation in right flank.   EXT: Full ROM, no bony tenderness, no pedal edema, no calf tenderness  NEURO: normal motor. normal sensory.   PSYCH: Cooperative, appropriate.

## 2024-03-01 DIAGNOSIS — K83.09 OTHER CHOLANGITIS: ICD-10-CM

## 2024-03-01 LAB
ALBUMIN SERPL ELPH-MCNC: 3.4 G/DL — LOW (ref 3.5–5.2)
ALBUMIN SERPL ELPH-MCNC: 3.6 G/DL — SIGNIFICANT CHANGE UP (ref 3.5–5.2)
ALP SERPL-CCNC: 157 U/L — HIGH (ref 30–115)
ALP SERPL-CCNC: 170 U/L — HIGH (ref 30–115)
ALT FLD-CCNC: 167 U/L — HIGH (ref 0–41)
ALT FLD-CCNC: 215 U/L — HIGH (ref 0–41)
ANION GAP SERPL CALC-SCNC: 11 MMOL/L — SIGNIFICANT CHANGE UP (ref 7–14)
ANION GAP SERPL CALC-SCNC: 11 MMOL/L — SIGNIFICANT CHANGE UP (ref 7–14)
APPEARANCE UR: CLEAR — SIGNIFICANT CHANGE UP
AST SERPL-CCNC: 49 U/L — HIGH (ref 0–41)
AST SERPL-CCNC: 75 U/L — HIGH (ref 0–41)
BASE EXCESS BLDV CALC-SCNC: 0.6 MMOL/L — SIGNIFICANT CHANGE UP (ref -2–3)
BASOPHILS # BLD AUTO: 0.02 K/UL — SIGNIFICANT CHANGE UP (ref 0–0.2)
BASOPHILS # BLD AUTO: 0.04 K/UL — SIGNIFICANT CHANGE UP (ref 0–0.2)
BASOPHILS NFR BLD AUTO: 0.3 % — SIGNIFICANT CHANGE UP (ref 0–1)
BASOPHILS NFR BLD AUTO: 0.5 % — SIGNIFICANT CHANGE UP (ref 0–1)
BILIRUB DIRECT SERPL-MCNC: 1.1 MG/DL — HIGH (ref 0–0.3)
BILIRUB DIRECT SERPL-MCNC: 3.7 MG/DL — HIGH (ref 0–0.3)
BILIRUB INDIRECT FLD-MCNC: 0.6 MG/DL — SIGNIFICANT CHANGE UP (ref 0.2–1.2)
BILIRUB INDIRECT FLD-MCNC: 0.8 MG/DL — SIGNIFICANT CHANGE UP (ref 0.2–1.2)
BILIRUB SERPL-MCNC: 1.9 MG/DL — HIGH (ref 0.2–1.2)
BILIRUB SERPL-MCNC: 4.3 MG/DL — HIGH (ref 0.2–1.2)
BILIRUB UR-MCNC: ABNORMAL
BUN SERPL-MCNC: 14 MG/DL — SIGNIFICANT CHANGE UP (ref 10–20)
BUN SERPL-MCNC: 14 MG/DL — SIGNIFICANT CHANGE UP (ref 10–20)
CA-I SERPL-SCNC: 1.17 MMOL/L — SIGNIFICANT CHANGE UP (ref 1.15–1.33)
CALCIUM SERPL-MCNC: 8.6 MG/DL — SIGNIFICANT CHANGE UP (ref 8.4–10.4)
CALCIUM SERPL-MCNC: 8.8 MG/DL — SIGNIFICANT CHANGE UP (ref 8.4–10.5)
CHLORIDE SERPL-SCNC: 102 MMOL/L — SIGNIFICANT CHANGE UP (ref 98–110)
CHLORIDE SERPL-SCNC: 105 MMOL/L — SIGNIFICANT CHANGE UP (ref 98–110)
CO2 SERPL-SCNC: 24 MMOL/L — SIGNIFICANT CHANGE UP (ref 17–32)
CO2 SERPL-SCNC: 25 MMOL/L — SIGNIFICANT CHANGE UP (ref 17–32)
COLOR SPEC: SIGNIFICANT CHANGE UP
CREAT SERPL-MCNC: 0.9 MG/DL — SIGNIFICANT CHANGE UP (ref 0.7–1.5)
CREAT SERPL-MCNC: 1 MG/DL — SIGNIFICANT CHANGE UP (ref 0.7–1.5)
DIFF PNL FLD: NEGATIVE — SIGNIFICANT CHANGE UP
EGFR: 106 ML/MIN/1.73M2 — SIGNIFICANT CHANGE UP
EGFR: 93 ML/MIN/1.73M2 — SIGNIFICANT CHANGE UP
EOSINOPHIL # BLD AUTO: 0.05 K/UL — SIGNIFICANT CHANGE UP (ref 0–0.7)
EOSINOPHIL # BLD AUTO: 0.2 K/UL — SIGNIFICANT CHANGE UP (ref 0–0.7)
EOSINOPHIL NFR BLD AUTO: 0.6 % — SIGNIFICANT CHANGE UP (ref 0–8)
EOSINOPHIL NFR BLD AUTO: 2.7 % — SIGNIFICANT CHANGE UP (ref 0–8)
GAS PNL BLDV: 134 MMOL/L — LOW (ref 136–145)
GAS PNL BLDV: SIGNIFICANT CHANGE UP
GAS PNL BLDV: SIGNIFICANT CHANGE UP
GLUCOSE SERPL-MCNC: 82 MG/DL — SIGNIFICANT CHANGE UP (ref 70–99)
GLUCOSE SERPL-MCNC: 91 MG/DL — SIGNIFICANT CHANGE UP (ref 70–99)
GLUCOSE UR QL: NEGATIVE MG/DL — SIGNIFICANT CHANGE UP
HCO3 BLDV-SCNC: 27 MMOL/L — SIGNIFICANT CHANGE UP (ref 22–29)
HCT VFR BLD CALC: 36.5 % — LOW (ref 42–52)
HCT VFR BLD CALC: 36.9 % — LOW (ref 42–52)
HCT VFR BLDA CALC: 40 % — SIGNIFICANT CHANGE UP (ref 39–51)
HGB BLD CALC-MCNC: 13.2 G/DL — SIGNIFICANT CHANGE UP (ref 12.6–17.4)
HGB BLD-MCNC: 12.4 G/DL — LOW (ref 14–18)
HGB BLD-MCNC: 12.7 G/DL — LOW (ref 14–18)
IMM GRANULOCYTES NFR BLD AUTO: 0.1 % — SIGNIFICANT CHANGE UP (ref 0.1–0.3)
IMM GRANULOCYTES NFR BLD AUTO: 0.1 % — SIGNIFICANT CHANGE UP (ref 0.1–0.3)
KETONES UR-MCNC: NEGATIVE MG/DL — SIGNIFICANT CHANGE UP
LACTATE BLDV-MCNC: 2 MMOL/L — SIGNIFICANT CHANGE UP (ref 0.5–2)
LEUKOCYTE ESTERASE UR-ACNC: ABNORMAL
LYMPHOCYTES # BLD AUTO: 1.32 K/UL — SIGNIFICANT CHANGE UP (ref 1.2–3.4)
LYMPHOCYTES # BLD AUTO: 16.6 % — LOW (ref 20.5–51.1)
LYMPHOCYTES # BLD AUTO: 2.17 K/UL — SIGNIFICANT CHANGE UP (ref 1.2–3.4)
LYMPHOCYTES # BLD AUTO: 29.3 % — SIGNIFICANT CHANGE UP (ref 20.5–51.1)
MAGNESIUM SERPL-MCNC: 1.7 MG/DL — LOW (ref 1.8–2.4)
MAGNESIUM SERPL-MCNC: 1.8 MG/DL — SIGNIFICANT CHANGE UP (ref 1.8–2.4)
MCHC RBC-ENTMCNC: 29.5 PG — SIGNIFICANT CHANGE UP (ref 27–31)
MCHC RBC-ENTMCNC: 29.7 PG — SIGNIFICANT CHANGE UP (ref 27–31)
MCHC RBC-ENTMCNC: 34 G/DL — SIGNIFICANT CHANGE UP (ref 32–37)
MCHC RBC-ENTMCNC: 34.4 G/DL — SIGNIFICANT CHANGE UP (ref 32–37)
MCV RBC AUTO: 85.6 FL — SIGNIFICANT CHANGE UP (ref 80–94)
MCV RBC AUTO: 87.5 FL — SIGNIFICANT CHANGE UP (ref 80–94)
MONOCYTES # BLD AUTO: 0.46 K/UL — SIGNIFICANT CHANGE UP (ref 0.1–0.6)
MONOCYTES # BLD AUTO: 0.62 K/UL — HIGH (ref 0.1–0.6)
MONOCYTES NFR BLD AUTO: 6.2 % — SIGNIFICANT CHANGE UP (ref 1.7–9.3)
MONOCYTES NFR BLD AUTO: 7.8 % — SIGNIFICANT CHANGE UP (ref 1.7–9.3)
NEUTROPHILS # BLD AUTO: 4.52 K/UL — SIGNIFICANT CHANGE UP (ref 1.4–6.5)
NEUTROPHILS # BLD AUTO: 5.94 K/UL — SIGNIFICANT CHANGE UP (ref 1.4–6.5)
NEUTROPHILS NFR BLD AUTO: 61.2 % — SIGNIFICANT CHANGE UP (ref 42.2–75.2)
NEUTROPHILS NFR BLD AUTO: 74.6 % — SIGNIFICANT CHANGE UP (ref 42.2–75.2)
NITRITE UR-MCNC: POSITIVE
NRBC # BLD: 0 /100 WBCS — SIGNIFICANT CHANGE UP (ref 0–0)
NRBC # BLD: 0 /100 WBCS — SIGNIFICANT CHANGE UP (ref 0–0)
PCO2 BLDV: 52 MMHG — SIGNIFICANT CHANGE UP (ref 42–55)
PH BLDV: 7.33 — SIGNIFICANT CHANGE UP (ref 7.32–7.43)
PH UR: 5.5 — SIGNIFICANT CHANGE UP (ref 5–8)
PHOSPHATE SERPL-MCNC: 3.1 MG/DL — SIGNIFICANT CHANGE UP (ref 2.1–4.9)
PHOSPHATE SERPL-MCNC: 3.6 MG/DL — SIGNIFICANT CHANGE UP (ref 2.1–4.9)
PLATELET # BLD AUTO: 174 K/UL — SIGNIFICANT CHANGE UP (ref 130–400)
PLATELET # BLD AUTO: 186 K/UL — SIGNIFICANT CHANGE UP (ref 130–400)
PMV BLD: 10 FL — SIGNIFICANT CHANGE UP (ref 7.4–10.4)
PMV BLD: 9.9 FL — SIGNIFICANT CHANGE UP (ref 7.4–10.4)
PO2 BLDV: 14 MMHG — LOW (ref 25–45)
POTASSIUM BLDV-SCNC: 4.6 MMOL/L — SIGNIFICANT CHANGE UP (ref 3.5–5.1)
POTASSIUM SERPL-MCNC: 4 MMOL/L — SIGNIFICANT CHANGE UP (ref 3.5–5)
POTASSIUM SERPL-MCNC: 4.2 MMOL/L — SIGNIFICANT CHANGE UP (ref 3.5–5)
POTASSIUM SERPL-SCNC: 4 MMOL/L — SIGNIFICANT CHANGE UP (ref 3.5–5)
POTASSIUM SERPL-SCNC: 4.2 MMOL/L — SIGNIFICANT CHANGE UP (ref 3.5–5)
PROT SERPL-MCNC: 5.7 G/DL — LOW (ref 6–8)
PROT SERPL-MCNC: 5.7 G/DL — LOW (ref 6–8)
PROT UR-MCNC: 30 MG/DL
RBC # BLD: 4.17 M/UL — LOW (ref 4.7–6.1)
RBC # BLD: 4.31 M/UL — LOW (ref 4.7–6.1)
RBC # FLD: 12.8 % — SIGNIFICANT CHANGE UP (ref 11.5–14.5)
RBC # FLD: 12.8 % — SIGNIFICANT CHANGE UP (ref 11.5–14.5)
SAO2 % BLDV: 22.6 % — LOW (ref 67–88)
SODIUM SERPL-SCNC: 137 MMOL/L — SIGNIFICANT CHANGE UP (ref 135–146)
SODIUM SERPL-SCNC: 141 MMOL/L — SIGNIFICANT CHANGE UP (ref 135–146)
SP GR SPEC: >=1.099 (ref 1–1.03)
TRIGL SERPL-MCNC: 70 MG/DL — SIGNIFICANT CHANGE UP
UROBILINOGEN FLD QL: 1 MG/DL — SIGNIFICANT CHANGE UP (ref 0.2–1)
WBC # BLD: 7.4 K/UL — SIGNIFICANT CHANGE UP (ref 4.8–10.8)
WBC # BLD: 7.96 K/UL — SIGNIFICANT CHANGE UP (ref 4.8–10.8)
WBC # FLD AUTO: 7.4 K/UL — SIGNIFICANT CHANGE UP (ref 4.8–10.8)
WBC # FLD AUTO: 7.96 K/UL — SIGNIFICANT CHANGE UP (ref 4.8–10.8)

## 2024-03-01 PROCEDURE — 83735 ASSAY OF MAGNESIUM: CPT

## 2024-03-01 PROCEDURE — C1769: CPT

## 2024-03-01 PROCEDURE — 85730 THROMBOPLASTIN TIME PARTIAL: CPT

## 2024-03-01 PROCEDURE — 93010 ELECTROCARDIOGRAM REPORT: CPT

## 2024-03-01 PROCEDURE — 74183 MRI ABD W/O CNTR FLWD CNTR: CPT | Mod: MC

## 2024-03-01 PROCEDURE — 99223 1ST HOSP IP/OBS HIGH 75: CPT

## 2024-03-01 PROCEDURE — 80076 HEPATIC FUNCTION PANEL: CPT

## 2024-03-01 PROCEDURE — C9399: CPT

## 2024-03-01 PROCEDURE — 84100 ASSAY OF PHOSPHORUS: CPT

## 2024-03-01 PROCEDURE — 86900 BLOOD TYPING SEROLOGIC ABO: CPT

## 2024-03-01 PROCEDURE — 36415 COLL VENOUS BLD VENIPUNCTURE: CPT

## 2024-03-01 PROCEDURE — 85610 PROTHROMBIN TIME: CPT

## 2024-03-01 PROCEDURE — 88304 TISSUE EXAM BY PATHOLOGIST: CPT

## 2024-03-01 PROCEDURE — 85025 COMPLETE CBC W/AUTO DIFF WBC: CPT

## 2024-03-01 PROCEDURE — C1889: CPT

## 2024-03-01 PROCEDURE — 93005 ELECTROCARDIOGRAM TRACING: CPT

## 2024-03-01 PROCEDURE — 86901 BLOOD TYPING SEROLOGIC RH(D): CPT

## 2024-03-01 PROCEDURE — S2900: CPT

## 2024-03-01 PROCEDURE — 81001 URINALYSIS AUTO W/SCOPE: CPT

## 2024-03-01 PROCEDURE — 86850 RBC ANTIBODY SCREEN: CPT

## 2024-03-01 PROCEDURE — 80048 BASIC METABOLIC PNL TOTAL CA: CPT

## 2024-03-01 PROCEDURE — 74018 RADEX ABDOMEN 1 VIEW: CPT

## 2024-03-01 PROCEDURE — 71045 X-RAY EXAM CHEST 1 VIEW: CPT

## 2024-03-01 PROCEDURE — A9579: CPT

## 2024-03-01 RX ORDER — ACETAMINOPHEN 500 MG
650 TABLET ORAL EVERY 6 HOURS
Refills: 0 | Status: DISCONTINUED | OUTPATIENT
Start: 2024-03-01 | End: 2024-03-04

## 2024-03-01 RX ORDER — AMPICILLIN SODIUM AND SULBACTAM SODIUM 250; 125 MG/ML; MG/ML
3 INJECTION, POWDER, FOR SUSPENSION INTRAMUSCULAR; INTRAVENOUS ONCE
Refills: 0 | Status: COMPLETED | OUTPATIENT
Start: 2024-03-01 | End: 2024-03-01

## 2024-03-01 RX ORDER — SODIUM CHLORIDE 9 MG/ML
1000 INJECTION, SOLUTION INTRAVENOUS
Refills: 0 | Status: DISCONTINUED | OUTPATIENT
Start: 2024-03-01 | End: 2024-03-04

## 2024-03-01 RX ORDER — SODIUM CHLORIDE 9 MG/ML
1000 INJECTION, SOLUTION INTRAVENOUS ONCE
Refills: 0 | Status: COMPLETED | OUTPATIENT
Start: 2024-03-01 | End: 2024-03-01

## 2024-03-01 RX ORDER — BACLOFEN 100 %
5 POWDER (GRAM) MISCELLANEOUS EVERY 8 HOURS
Refills: 0 | Status: DISCONTINUED | OUTPATIENT
Start: 2024-03-01 | End: 2024-03-04

## 2024-03-01 RX ORDER — ENOXAPARIN SODIUM 100 MG/ML
40 INJECTION SUBCUTANEOUS EVERY 24 HOURS
Refills: 0 | Status: DISCONTINUED | OUTPATIENT
Start: 2024-03-01 | End: 2024-03-04

## 2024-03-01 RX ORDER — TRAMADOL HYDROCHLORIDE 50 MG/1
50 TABLET ORAL
Refills: 0 | Status: DISCONTINUED | OUTPATIENT
Start: 2024-03-01 | End: 2024-03-04

## 2024-03-01 RX ORDER — GABAPENTIN 400 MG/1
600 CAPSULE ORAL THREE TIMES A DAY
Refills: 0 | Status: DISCONTINUED | OUTPATIENT
Start: 2024-03-01 | End: 2024-03-04

## 2024-03-01 RX ORDER — AMPICILLIN SODIUM AND SULBACTAM SODIUM 250; 125 MG/ML; MG/ML
INJECTION, POWDER, FOR SUSPENSION INTRAMUSCULAR; INTRAVENOUS
Refills: 0 | Status: DISCONTINUED | OUTPATIENT
Start: 2024-03-01 | End: 2024-03-04

## 2024-03-01 RX ORDER — AMPICILLIN SODIUM AND SULBACTAM SODIUM 250; 125 MG/ML; MG/ML
3 INJECTION, POWDER, FOR SUSPENSION INTRAMUSCULAR; INTRAVENOUS EVERY 6 HOURS
Refills: 0 | Status: DISCONTINUED | OUTPATIENT
Start: 2024-03-02 | End: 2024-03-04

## 2024-03-01 RX ADMIN — ENOXAPARIN SODIUM 40 MILLIGRAM(S): 100 INJECTION SUBCUTANEOUS at 11:40

## 2024-03-01 RX ADMIN — TRAMADOL HYDROCHLORIDE 50 MILLIGRAM(S): 50 TABLET ORAL at 11:40

## 2024-03-01 RX ADMIN — Medication 650 MILLIGRAM(S): at 17:16

## 2024-03-01 RX ADMIN — Medication 650 MILLIGRAM(S): at 23:05

## 2024-03-01 RX ADMIN — GABAPENTIN 600 MILLIGRAM(S): 400 CAPSULE ORAL at 15:10

## 2024-03-01 RX ADMIN — Medication 650 MILLIGRAM(S): at 05:03

## 2024-03-01 RX ADMIN — TRAMADOL HYDROCHLORIDE 50 MILLIGRAM(S): 50 TABLET ORAL at 17:16

## 2024-03-01 RX ADMIN — GABAPENTIN 600 MILLIGRAM(S): 400 CAPSULE ORAL at 05:03

## 2024-03-01 RX ADMIN — SODIUM CHLORIDE 1000 MILLILITER(S): 9 INJECTION, SOLUTION INTRAVENOUS at 00:43

## 2024-03-01 RX ADMIN — AMPICILLIN SODIUM AND SULBACTAM SODIUM 200 GRAM(S): 250; 125 INJECTION, POWDER, FOR SUSPENSION INTRAMUSCULAR; INTRAVENOUS at 18:30

## 2024-03-01 RX ADMIN — SODIUM CHLORIDE 150 MILLILITER(S): 9 INJECTION, SOLUTION INTRAVENOUS at 05:02

## 2024-03-01 RX ADMIN — Medication 5 MILLIGRAM(S): at 05:03

## 2024-03-01 RX ADMIN — TRAMADOL HYDROCHLORIDE 50 MILLIGRAM(S): 50 TABLET ORAL at 05:03

## 2024-03-01 RX ADMIN — Medication 650 MILLIGRAM(S): at 11:40

## 2024-03-01 RX ADMIN — AMPICILLIN SODIUM AND SULBACTAM SODIUM 200 GRAM(S): 250; 125 INJECTION, POWDER, FOR SUSPENSION INTRAMUSCULAR; INTRAVENOUS at 23:05

## 2024-03-01 RX ADMIN — Medication 5 MILLIGRAM(S): at 17:16

## 2024-03-01 RX ADMIN — TRAMADOL HYDROCHLORIDE 50 MILLIGRAM(S): 50 TABLET ORAL at 23:05

## 2024-03-01 RX ADMIN — GABAPENTIN 600 MILLIGRAM(S): 400 CAPSULE ORAL at 21:07

## 2024-03-01 RX ADMIN — Medication 5 MILLIGRAM(S): at 21:07
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
## 2024-03-01 NOTE — CONSULT NOTE ADULT - SUBJECTIVE AND OBJECTIVE BOX
Patient is a 48-year-old gentleman with a past medical history of hypertension, obesity requiring laparoscopic Samuel-en-Y, COPD, ABI, fatty liver, and chronic pain from prior trauma in which she had multiple orthopedic interventions whom presented from home for abdominal pain.    PAST MEDICAL & SURGICAL HISTORY:  Chronic neck and back pain  Obesities, morbid- Had prior Laparoscopic Samuel En Y for weight loss with Dr Davis  Asthma  COPD (chronic obstructive pulmonary disease)  Fatty liver  HTN (hypertension)  ABI (obstructive sleep apnea)  S/P ORIF (open reduction internal fixation) fracture LT ANKLE  H/O arthroscopy of knee  History of carpal tunnel release of both wrists 06/2018 RIght  H/O gastric bypass 9/2021      MEDICATIONS  (STANDING):  acetaminophen     Tablet .. 650 milliGRAM(s) Oral every 6 hours  baclofen 5 milliGRAM(s) Oral every 8 hours  enoxaparin Injectable 40 milliGRAM(s) SubCutaneous every 24 hours  gabapentin 600 milliGRAM(s) Oral three times a day  lactated ringers. 1000 milliLiter(s) (150 mL/Hr) IV Continuous <Continuous>  traMADol 50 milliGRAM(s) Oral four times a day      Allergies  No Known Allergies      Review of Systems  General:  Denies Fatigue, Denies Fever, Denies Weakness ,Denies Weight Loss   HEENT: Denies Trouble Swallowing ,Denies  Sore Throat , Denies Change in hearing/vision/speech ,Denies Dizziness    Cardio: Denies  Chest Pain , Palpitations    Respiratory: Denies worsening of SOB, Denies Cough  Abdomen: See detailed HPI  Neuro: Denies Headache Denies Dizziness, Denies Paresthesias  MSK: Denies pain in Bones/Joints/Muscles   Psych: Patient denies depression, denies suicidal or homicidal ideations  Integ: Patient Denies rash, or new skin lesions     Vital Signs Last 24 Hrs  T(C): 36.5 (01 Mar 2024 08:12), Max: 37.3 (01 Mar 2024 05:26)  T(F): 97.7 (01 Mar 2024 08:12), Max: 99.1 (01 Mar 2024 05:26)  HR: 56 (01 Mar 2024 08:12) (56 - 88)  BP: 102/55 (01 Mar 2024 08:12) (96/54 - 135/85)  BP(mean): --  RR: 18 (01 Mar 2024 08:12) (18 - 18)  SpO2: 97% (01 Mar 2024 08:12) (97% - 99%)    Parameters below as of 01 Mar 2024 00:35  Patient On (Oxygen Delivery Method): room air    Physical Exam  Gen: NAD  Head: NC/AT, no visible deformity  ENT: PERRLA, Sclera Icteric   Cardio: S1/S2 No S3/S4, Regular  Resp: CTA B/L  Abdomen: Soft, ND/ minimal pain on deep palpation of RUQ  Neuro: AAOx3  Extremities: FROM x 4  Skin: Slight jaundice , no excoriation       Labs:               12.7   7.96  )-----------( 186      ( 01 Mar 2024 06:47 )             36.9       Auto Neutrophil %: 74.6 % (03-01-24 @ 06:47)  Auto Immature Granulocyte %: 0.1 % (03-01-24 @ 06:47)  Auto Neutrophil %: 86.9 % (02-29-24 @ 20:27)  Auto Immature Granulocyte %: 0.4 % (02-29-24 @ 20:27)    03-01    137  |  102  |  14  ----------------------------<  91  4.0   |  24  |  1.0      Calcium: 8.6 mg/dL (03-01-24 @ 06:47)      LFTs:             5.7  | 4.3  | 75       ------------------[170     ( 01 Mar 2024 06:47 )  3.6  | 3.7  | 215           Blood Gas Venous - Lactate: 2.0 mmol/L (03-01-24 @ 00:39)    Urinalysis Basic - ( 01 Mar 2024 06:47 )  Color: x / Appearance: x / SG: x / pH: x  Gluc: 91 mg/dL / Ketone: x  / Bili: x / Urobili: x   Blood: x / Protein: x / Nitrite: x   Leuk Esterase: x / RBC: x / WBC x   Sq Epi: x / Non Sq Epi: x / Bacteria: x      RADIOLOGY & ADDITIONAL STUDIES:  US Abdomen Upper Quadrant Right 02.29.24   IMPRESSION:  The gallbladder is distended with sludge without definite wall thickening.  Otherwise, unremarkable ultrasound.  CBD 4mm      CT Abdomen and Pelvis w/ Oral Cont and w/ IV Cont 02.29.24   IMPRESSION:    No evidence of bowel obstruction or pneumoperitoneum. The oral contrast   has reached the mid to distal small bowel without evidence of   extravasation.    Distended gallbladder with questionable circumferential wall thickening.   Please correlate with concurrent ultrasound.    Pancreatic lesions can be further evaluated with nonemergent outpatient   MRI with and without contrast. These most likely reflect IPMN       Patient is a 48-year-old gentleman with a past medical history of hypertension, obesity requiring laparoscopic Samuel-en-Y, COPD, ABI, fatty liver, and chronic pain from prior trauma in which she had multiple orthopedic interventions whom presented from home for abdominal pain. Patient notes pain started 1 week prior located primarily in the epigastric area with radiation towards the right upper quadrant and right flank.  Pain started off dull and progressively became worse and sharp.  Pain associated with nausea and vomiting.  Patient denies fever, chills, change in bowel movements, new medication intake, or excessive alcohol use.    PAST MEDICAL & SURGICAL HISTORY:  Chronic neck and back pain  Obesities, morbid- Had prior Laparoscopic Samuel En Y for weight loss with Dr Daivs  Asthma  COPD (chronic obstructive pulmonary disease)  Fatty liver  HTN (hypertension)  ABI (obstructive sleep apnea)  S/P ORIF (open reduction internal fixation) fracture LT ANKLE  H/O arthroscopy of knee  History of carpal tunnel release of both wrists 06/2018 RIght  H/O gastric bypass 9/2021      MEDICATIONS  (STANDING):  acetaminophen     Tablet .. 650 milliGRAM(s) Oral every 6 hours  baclofen 5 milliGRAM(s) Oral every 8 hours  enoxaparin Injectable 40 milliGRAM(s) SubCutaneous every 24 hours  gabapentin 600 milliGRAM(s) Oral three times a day  lactated ringers. 1000 milliLiter(s) (150 mL/Hr) IV Continuous <Continuous>  traMADol 50 milliGRAM(s) Oral four times a day      Allergies  No Known Allergies      Review of Systems  General:  Denies Fatigue, Denies Fever, Denies Weakness ,Denies Weight Loss   HEENT: Denies Trouble Swallowing ,Denies  Sore Throat , Denies Change in hearing/vision/speech ,Denies Dizziness    Cardio: Denies  Chest Pain , Palpitations    Respiratory: Denies worsening of SOB, Denies Cough  Abdomen: See detailed HPI  Neuro: Denies Headache Denies Dizziness, Denies Paresthesias  MSK: Denies pain in Bones/Joints/Muscles   Psych: Patient denies depression, denies suicidal or homicidal ideations  Integ: Patient Denies rash, or new skin lesions     Vital Signs Last 24 Hrs  T(C): 36.5 (01 Mar 2024 08:12), Max: 37.3 (01 Mar 2024 05:26)  T(F): 97.7 (01 Mar 2024 08:12), Max: 99.1 (01 Mar 2024 05:26)  HR: 56 (01 Mar 2024 08:12) (56 - 88)  BP: 102/55 (01 Mar 2024 08:12) (96/54 - 135/85)  BP(mean): --  RR: 18 (01 Mar 2024 08:12) (18 - 18)  SpO2: 97% (01 Mar 2024 08:12) (97% - 99%)    Parameters below as of 01 Mar 2024 00:35  Patient On (Oxygen Delivery Method): room air    Physical Exam  Gen: NAD  Head: NC/AT, no visible deformity  ENT: PERRLA, Sclera Icteric   Cardio: S1/S2 No S3/S4, Regular  Resp: CTA B/L  Abdomen: Soft, ND/ minimal pain on deep palpation of RUQ  Neuro: AAOx3  Extremities: FROM x 4  Skin: Slight jaundice , no excoriation       Labs:               12.7   7.96  )-----------( 186      ( 01 Mar 2024 06:47 )             36.9       Auto Neutrophil %: 74.6 % (03-01-24 @ 06:47)  Auto Immature Granulocyte %: 0.1 % (03-01-24 @ 06:47)  Auto Neutrophil %: 86.9 % (02-29-24 @ 20:27)  Auto Immature Granulocyte %: 0.4 % (02-29-24 @ 20:27)    03-01    137  |  102  |  14  ----------------------------<  91  4.0   |  24  |  1.0      Calcium: 8.6 mg/dL (03-01-24 @ 06:47)      LFTs:             5.7  | 4.3  | 75       ------------------[170     ( 01 Mar 2024 06:47 )  3.6  | 3.7  | 215           Blood Gas Venous - Lactate: 2.0 mmol/L (03-01-24 @ 00:39)    Urinalysis Basic - ( 01 Mar 2024 06:47 )  Color: x / Appearance: x / SG: x / pH: x  Gluc: 91 mg/dL / Ketone: x  / Bili: x / Urobili: x   Blood: x / Protein: x / Nitrite: x   Leuk Esterase: x / RBC: x / WBC x   Sq Epi: x / Non Sq Epi: x / Bacteria: x      RADIOLOGY & ADDITIONAL STUDIES:  US Abdomen Upper Quadrant Right 02.29.24   IMPRESSION:  The gallbladder is distended with sludge without definite wall thickening.  Otherwise, unremarkable ultrasound.  CBD 4mm      CT Abdomen and Pelvis w/ Oral Cont and w/ IV Cont 02.29.24   IMPRESSION:    No evidence of bowel obstruction or pneumoperitoneum. The oral contrast   has reached the mid to distal small bowel without evidence of   extravasation.    Distended gallbladder with questionable circumferential wall thickening.   Please correlate with concurrent ultrasound.    Pancreatic lesions can be further evaluated with nonemergent outpatient   MRI with and without contrast. These most likely reflect IPMN

## 2024-03-01 NOTE — CONSULT NOTE ADULT - NS ATTEND AMEND GEN_ALL_CORE FT
To get better and follow your care plan as instructed. Patient seen and examined on advance GI rounds.  All pertinent lab and imaging findings reviewed by me.  Plan modified above where needed.  For any additional questions and or concerns please reach out to me directly on teams

## 2024-03-01 NOTE — CONSULT NOTE ADULT - ASSESSMENT
Patient is a 48-year-old gentleman with a past medical history of hypertension, obesity requiring laparoscopic Samuel-en-Y, COPD, ABI, fatty liver, and chronic pain from prior trauma in which she had multiple orthopedic interventions whom presented from home for abdominal pain. Patient notes pain started 1 week prior located primarily in the epigastric area with radiation towards the right upper quadrant and right flank.  Pain started off dull and progressively became worse and sharp.  Pain associated with nausea and vomiting.  Patient denies fever, chills, change in bowel movements, new medication intake, or excessive alcohol use.    Pancreatitis / Cholelithiasis with large distended Gallbladder / LFT abnormalities/ IPMN  - Clinical exam reassuring  - CBD 4mm, Large intact Gallbladder  - Pancreas with calcifications - IPMN- Can investigate further via EUS as outpatient   - IV hydration, would advise 250ml/hr for first 24 hours  - TG levels 70  - Consider [place] level as ETOH level would not be accurate, denies use though  - Pain control  - Can consider CCY with IOC or MRI prior- whichever primary team prefers   - Will continue to follow

## 2024-03-01 NOTE — H&P ADULT - NSHPLABSRESULTS_GEN_ALL_CORE
LAB/STUDIES:                        15.2   12.90 )-----------( 225      ( 2024 20:27 )             44.0       141  |  101  |  17  ----------------------------<  128<H>  4.2   |  27  |  1.1    Ca    9.5      2024 20:27    TPro  x   /  Alb  x   /  TBili  x   /  DBili  3.7<H>  /  AST  x   /  ALT  x   /  AlkPhos  x   03-      LIVER FUNCTIONS - ( 2024 20:27 )  Alb: 4.2 g/dL / Pro: 7.0 g/dL / ALK PHOS: 193 U/L / ALT: 264 U/L / AST: 91 U/L / GGT: x           Urinalysis Basic - ( 2024 21:52 )    Color: Dark Yellow / Appearance: Cloudy / S.026 / pH: x  Gluc: x / Ketone: Trace mg/dL  / Bili: Moderate / Urobili: 1.0 mg/dL   Blood: x / Protein: Trace mg/dL / Nitrite: Positive   Leuk Esterase: Trace / RBC: 4 /HPF / WBC 2 /HPF   Sq Epi: x / Non Sq Epi: 2 /HPF / Bacteria: Negative /HPF    IMAGING:  < from: CT Abdomen and Pelvis w/ Oral Cont and w/ IV Cont (24 @ 22:14) >  HEPATOBILIARY: There is mild nonspecific periportal edema.  No evidence   of intra or extrahepatic biliary dilatation. The gallbladder is distended   with questionable circumferential wall thickening. Please correlate with   concurrent ultrasound.    PANCREAS: 1.6 cm hypodense lesion is noted in the uncinate process with a   small lesion identified in the neck measuring up to 1.3 cm.    PERITONEUM/MESENTERY/BOWEL: No bowel obstruction, ascites or free   intraperitoneal air. Appendix is normal in caliber. The oral contrast has   reached the mid to distal small bowel    IMPRESSION:  No evidence of bowel obstruction or pneumoperitoneum. The oral contrast   has reached the mid to distal small bowel without evidence of   extravasation.    Distended gallbladder with questionable circumferential wall thickening.   Please correlate with concurrent ultrasound.    Pancreatic lesions can be further evaluated with nonemergent outpatient   MRI with and without contrast. These most likely reflect IPMN    --- End of Report ---  < end of copied text >      < from: US Abdomen Upper Quadrant Right (24 @ 22:35) >  Liver: Within normal limits.  Bile ducts: Normal caliber. Common bile duct measures 4 mm.  Gallbladder: The gallbladder is distended with sludge without definite   wall thickening. Negative sonographic Sutherland's sign.  Pancreas: Visualized portions are within normal limits.  Right kidney: 11.9 cm.. No hydronephrosis.  Ascites: None.  IVC: Visualized portions are within normal limits.      IMPRESSION:  The gallbladder is distended with sludge without definite wall thickening.  Otherwise, unremarkable ultrasound.  < end of copied text >

## 2024-03-01 NOTE — H&P ADULT - NSHPPHYSICALEXAM_GEN_ALL_CORE
VITALS:  T(F): 98.5 (03-01-24 @ 00:35), Max: 98.5 (02-29-24 @ 19:40)  HR: 67 (03-01-24 @ 00:35) (67 - 88)  BP: 97/55 (03-01-24 @ 00:35) (97/55 - 135/85)  RR: 18 (03-01-24 @ 00:35) (18 - 18)  SpO2: 98% (03-01-24 @ 00:35) (98% - 98%)    PHYSICAL EXAM:  General Appearance: NAD, obese  HEENT: Normocephalic, atraumatic, trachea midline, mild scleral icterus  Heart: s1, s2  Lungs: No increased work of breathing or accessory muscle use. Symmetric chest wall rise and fall. Bilateral breath sounds  Abdomen:  Soft, nondistended. No rebound or guarding. +ttp mildly ruq  MSK/Extremities: Warm & well-perfused.   Skin: Warm, dry.
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
## 2024-03-01 NOTE — PATIENT PROFILE ADULT - FALL HARM RISK - HARM RISK INTERVENTIONS
Communicate Risk of Fall with Harm to all staff/Reinforce activity limits and safety measures with patient and family/Tailored Fall Risk Interventions/Use of alarms - bed, chair and/or voice tab/Visual Cue: Yellow wristband and red socks/Bed in lowest position, wheels locked, appropriate side rails in place/Call bell, personal items and telephone in reach/Instruct patient to call for assistance before getting out of bed or chair/Non-slip footwear when patient is out of bed/[place] to call system/Physically safe environment - no spills, clutter or unnecessary equipment/Purposeful Proactive Rounding/Room/bathroom lighting operational, light cord in reach

## 2024-03-01 NOTE — H&P ADULT - ATTENDING COMMENTS
47 yo male with hx of RYGB with Dr. Horton in 2021 presents with several day hx of recurrent RUQ abdominal pain. Patient reports having a fever of 104 at the beginning of the week which resolved with tylenol but has been having postprandial RUQ pain after each meal that has been progressively worsening. Denies NSAID use, denies recent steroid use or alcohol consumption. Reports having occasional cigarettes, only smokes with someone else who has cigarettes. Doesn't remember the last time he smoked.    Afebrile now, no tachycardia  NAD  NL respirations  soft, RUQ tenderness without guarding or rigidity; nondistended    WBC 12.9, hgb 15.2, Cr 1.1  Lipase >3000, t bili 4.1  UA with pos nitrite and trace LE    CT scan with distended GB with possible wall thickening; normal RYGB anatomy; good progression of contrast though contrast in the pouch was not diluted and obscures the surrounding anatomy; pancreatic lesions    RUQ sono without signs of acute ángel, sludge, CBD 4mm    47 yo male with acute pancreatitis likely secondary to biliary sludge/stones; possible concurrent acute cholecystitis; biliary stasis with elevated bilirubin concerning for developing acute cholangitis; UTI; IPMN    admit to my service  IV abx for possible UTI/Acute Cholecystitis/prevention of cholangitis   IV hydration  Plan for lap ángel with IOC and possible transgastric ERCP     Case discussed at the length with Advanced GI team after admission; given degree of pancreatitis, would prefer to wait for ERCP if necessary to allow for improvement of inflammation; IPMN can be investigated with outpatient MRI    We reviewed the relative biliary and RYGB anatomy with the patient to explain the possible avenues of treatment; cholecystectomy today with IOC and attempted transcystic resolution of biliary obstruction with the possibility of having to leave a G tube to allow for future ERCP vs single stage Ángel + IOC + transgastric ERCP on Monday if IOC is positive    After discussion, will proceed with single stage Monday    Preop for Monday    Trend WBC (repeat now normal), LFTs, Fever curve; f/u blood cultures    If signs of cholangitis, will proceed with cholecystectomy with IOC emergently

## 2024-03-02 LAB
-  COAGULASE NEGATIVE STAPHYLOCOCCUS: SIGNIFICANT CHANGE UP
ALBUMIN SERPL ELPH-MCNC: 3.3 G/DL — LOW (ref 3.5–5.2)
ALP SERPL-CCNC: 173 U/L — HIGH (ref 30–115)
ALT FLD-CCNC: 122 U/L — HIGH (ref 0–41)
ANION GAP SERPL CALC-SCNC: 13 MMOL/L — SIGNIFICANT CHANGE UP (ref 7–14)
APTT BLD: 32.9 SEC — SIGNIFICANT CHANGE UP (ref 27–39.2)
AST SERPL-CCNC: 34 U/L — SIGNIFICANT CHANGE UP (ref 0–41)
BASOPHILS # BLD AUTO: 0.05 K/UL — SIGNIFICANT CHANGE UP (ref 0–0.2)
BASOPHILS NFR BLD AUTO: 0.6 % — SIGNIFICANT CHANGE UP (ref 0–1)
BILIRUB DIRECT SERPL-MCNC: 0.6 MG/DL — HIGH (ref 0–0.3)
BILIRUB INDIRECT FLD-MCNC: 0.6 MG/DL — SIGNIFICANT CHANGE UP (ref 0.2–1.2)
BILIRUB SERPL-MCNC: 1.2 MG/DL — SIGNIFICANT CHANGE UP (ref 0.2–1.2)
BLD GP AB SCN SERPL QL: SIGNIFICANT CHANGE UP
BUN SERPL-MCNC: 11 MG/DL — SIGNIFICANT CHANGE UP (ref 10–20)
CALCIUM SERPL-MCNC: 8.5 MG/DL — SIGNIFICANT CHANGE UP (ref 8.4–10.5)
CHLORIDE SERPL-SCNC: 104 MMOL/L — SIGNIFICANT CHANGE UP (ref 98–110)
CO2 SERPL-SCNC: 24 MMOL/L — SIGNIFICANT CHANGE UP (ref 17–32)
CREAT SERPL-MCNC: 0.8 MG/DL — SIGNIFICANT CHANGE UP (ref 0.7–1.5)
CULTURE RESULTS: SIGNIFICANT CHANGE UP
EGFR: 110 ML/MIN/1.73M2 — SIGNIFICANT CHANGE UP
EOSINOPHIL # BLD AUTO: 0.19 K/UL — SIGNIFICANT CHANGE UP (ref 0–0.7)
EOSINOPHIL NFR BLD AUTO: 2.1 % — SIGNIFICANT CHANGE UP (ref 0–8)
GLUCOSE SERPL-MCNC: 68 MG/DL — LOW (ref 70–99)
GRAM STN FLD: ABNORMAL
HCT VFR BLD CALC: 35.8 % — LOW (ref 42–52)
HGB BLD-MCNC: 12.3 G/DL — LOW (ref 14–18)
IMM GRANULOCYTES NFR BLD AUTO: 0.3 % — SIGNIFICANT CHANGE UP (ref 0.1–0.3)
INR BLD: 1.09 RATIO — SIGNIFICANT CHANGE UP (ref 0.65–1.3)
LYMPHOCYTES # BLD AUTO: 2.11 K/UL — SIGNIFICANT CHANGE UP (ref 1.2–3.4)
LYMPHOCYTES # BLD AUTO: 23.5 % — SIGNIFICANT CHANGE UP (ref 20.5–51.1)
MAGNESIUM SERPL-MCNC: 1.8 MG/DL — SIGNIFICANT CHANGE UP (ref 1.8–2.4)
MCHC RBC-ENTMCNC: 29.4 PG — SIGNIFICANT CHANGE UP (ref 27–31)
MCHC RBC-ENTMCNC: 34.4 G/DL — SIGNIFICANT CHANGE UP (ref 32–37)
MCV RBC AUTO: 85.4 FL — SIGNIFICANT CHANGE UP (ref 80–94)
METHOD TYPE: SIGNIFICANT CHANGE UP
MONOCYTES # BLD AUTO: 0.53 K/UL — SIGNIFICANT CHANGE UP (ref 0.1–0.6)
MONOCYTES NFR BLD AUTO: 5.9 % — SIGNIFICANT CHANGE UP (ref 1.7–9.3)
NEUTROPHILS # BLD AUTO: 6.05 K/UL — SIGNIFICANT CHANGE UP (ref 1.4–6.5)
NEUTROPHILS NFR BLD AUTO: 67.6 % — SIGNIFICANT CHANGE UP (ref 42.2–75.2)
NRBC # BLD: 0 /100 WBCS — SIGNIFICANT CHANGE UP (ref 0–0)
PHOSPHATE SERPL-MCNC: 3.3 MG/DL — SIGNIFICANT CHANGE UP (ref 2.1–4.9)
PLATELET # BLD AUTO: 186 K/UL — SIGNIFICANT CHANGE UP (ref 130–400)
PMV BLD: 10 FL — SIGNIFICANT CHANGE UP (ref 7.4–10.4)
POTASSIUM SERPL-MCNC: 4.1 MMOL/L — SIGNIFICANT CHANGE UP (ref 3.5–5)
POTASSIUM SERPL-SCNC: 4.1 MMOL/L — SIGNIFICANT CHANGE UP (ref 3.5–5)
PROT SERPL-MCNC: 5.5 G/DL — LOW (ref 6–8)
PROTHROM AB SERPL-ACNC: 12.4 SEC — SIGNIFICANT CHANGE UP (ref 9.95–12.87)
RBC # BLD: 4.19 M/UL — LOW (ref 4.7–6.1)
RBC # FLD: 12.4 % — SIGNIFICANT CHANGE UP (ref 11.5–14.5)
SODIUM SERPL-SCNC: 141 MMOL/L — SIGNIFICANT CHANGE UP (ref 135–146)
SPECIMEN SOURCE: SIGNIFICANT CHANGE UP
SPECIMEN SOURCE: SIGNIFICANT CHANGE UP
WBC # BLD: 8.96 K/UL — SIGNIFICANT CHANGE UP (ref 4.8–10.8)
WBC # FLD AUTO: 8.96 K/UL — SIGNIFICANT CHANGE UP (ref 4.8–10.8)

## 2024-03-02 PROCEDURE — 71045 X-RAY EXAM CHEST 1 VIEW: CPT | Mod: 26

## 2024-03-02 PROCEDURE — 74183 MRI ABD W/O CNTR FLWD CNTR: CPT | Mod: 26

## 2024-03-02 PROCEDURE — 99232 SBSQ HOSP IP/OBS MODERATE 35: CPT

## 2024-03-02 PROCEDURE — 93010 ELECTROCARDIOGRAM REPORT: CPT

## 2024-03-02 RX ORDER — PANTOPRAZOLE SODIUM 20 MG/1
40 TABLET, DELAYED RELEASE ORAL
Refills: 0 | Status: DISCONTINUED | OUTPATIENT
Start: 2024-03-02 | End: 2024-03-04

## 2024-03-02 RX ORDER — MAGNESIUM SULFATE 500 MG/ML
1 VIAL (ML) INJECTION ONCE
Refills: 0 | Status: COMPLETED | OUTPATIENT
Start: 2024-03-02 | End: 2024-03-02

## 2024-03-02 RX ADMIN — Medication 650 MILLIGRAM(S): at 23:45

## 2024-03-02 RX ADMIN — GABAPENTIN 600 MILLIGRAM(S): 400 CAPSULE ORAL at 05:07

## 2024-03-02 RX ADMIN — SODIUM CHLORIDE 150 MILLILITER(S): 9 INJECTION, SOLUTION INTRAVENOUS at 11:41

## 2024-03-02 RX ADMIN — Medication 100 GRAM(S): at 04:03

## 2024-03-02 RX ADMIN — AMPICILLIN SODIUM AND SULBACTAM SODIUM 200 GRAM(S): 250; 125 INJECTION, POWDER, FOR SUSPENSION INTRAMUSCULAR; INTRAVENOUS at 23:46

## 2024-03-02 RX ADMIN — TRAMADOL HYDROCHLORIDE 50 MILLIGRAM(S): 50 TABLET ORAL at 18:09

## 2024-03-02 RX ADMIN — Medication 5 MILLIGRAM(S): at 05:07

## 2024-03-02 RX ADMIN — Medication 650 MILLIGRAM(S): at 18:09

## 2024-03-02 RX ADMIN — TRAMADOL HYDROCHLORIDE 50 MILLIGRAM(S): 50 TABLET ORAL at 23:45

## 2024-03-02 RX ADMIN — ENOXAPARIN SODIUM 40 MILLIGRAM(S): 100 INJECTION SUBCUTANEOUS at 11:41

## 2024-03-02 RX ADMIN — TRAMADOL HYDROCHLORIDE 50 MILLIGRAM(S): 50 TABLET ORAL at 12:11

## 2024-03-02 RX ADMIN — AMPICILLIN SODIUM AND SULBACTAM SODIUM 200 GRAM(S): 250; 125 INJECTION, POWDER, FOR SUSPENSION INTRAMUSCULAR; INTRAVENOUS at 11:41

## 2024-03-02 RX ADMIN — Medication 650 MILLIGRAM(S): at 11:41

## 2024-03-02 RX ADMIN — Medication 5 MILLIGRAM(S): at 13:18

## 2024-03-02 RX ADMIN — Medication 650 MILLIGRAM(S): at 12:11

## 2024-03-02 RX ADMIN — GABAPENTIN 600 MILLIGRAM(S): 400 CAPSULE ORAL at 21:05

## 2024-03-02 RX ADMIN — TRAMADOL HYDROCHLORIDE 50 MILLIGRAM(S): 50 TABLET ORAL at 17:32

## 2024-03-02 RX ADMIN — TRAMADOL HYDROCHLORIDE 50 MILLIGRAM(S): 50 TABLET ORAL at 11:41

## 2024-03-02 RX ADMIN — PANTOPRAZOLE SODIUM 40 MILLIGRAM(S): 20 TABLET, DELAYED RELEASE ORAL at 13:18

## 2024-03-02 RX ADMIN — GABAPENTIN 600 MILLIGRAM(S): 400 CAPSULE ORAL at 13:18

## 2024-03-02 RX ADMIN — Medication 650 MILLIGRAM(S): at 17:32

## 2024-03-02 RX ADMIN — Medication 650 MILLIGRAM(S): at 05:07

## 2024-03-02 RX ADMIN — AMPICILLIN SODIUM AND SULBACTAM SODIUM 200 GRAM(S): 250; 125 INJECTION, POWDER, FOR SUSPENSION INTRAMUSCULAR; INTRAVENOUS at 05:07

## 2024-03-02 RX ADMIN — TRAMADOL HYDROCHLORIDE 50 MILLIGRAM(S): 50 TABLET ORAL at 05:07

## 2024-03-02 RX ADMIN — AMPICILLIN SODIUM AND SULBACTAM SODIUM 200 GRAM(S): 250; 125 INJECTION, POWDER, FOR SUSPENSION INTRAMUSCULAR; INTRAVENOUS at 17:32

## 2024-03-02 RX ADMIN — Medication 5 MILLIGRAM(S): at 21:05

## 2024-03-02 NOTE — PROGRESS NOTE ADULT - ASSESSMENT
48-year-old gentleman with a past medical history of hypertension, obesity requiring laparoscopic Samuel-en-Y, COPD, ABI, fatty liver, and chronic pain from prior trauma in which she had multiple orthopedic interventions whom presented from home for abdominal pain. Patient notes pain started 1 week prior located primarily in the epigastric area with radiation towards the right upper quadrant and right flank.  Pain started off dull and progressively became worse and sharp.  Pain associated with nausea and vomiting.      Pancreatitis / Cholelithiasis with large distended Gallbladder / LFT abnormalities/ IPMN  - CBD 4mm, Large intact Gallbladder  - Pancreas with calcifications - IPMN- Can investigate further via EUS as outpatient   - IV hydration, would advise 250ml/hr for first 24 hours  - TG levels 70      RECS  - c/w IVF @200cc/hr  - Pain control  - surgery planning for CCY on monday   - Consider [place] level as ETOH level would not be accurate, denies use though  - Will continue to follow

## 2024-03-03 LAB
ALBUMIN SERPL ELPH-MCNC: 3.3 G/DL — LOW (ref 3.5–5.2)
ALP SERPL-CCNC: 154 U/L — HIGH (ref 30–115)
ALT FLD-CCNC: 91 U/L — HIGH (ref 0–41)
ANION GAP SERPL CALC-SCNC: 12 MMOL/L — SIGNIFICANT CHANGE UP (ref 7–14)
APTT BLD: 32.8 SEC — SIGNIFICANT CHANGE UP (ref 27–39.2)
AST SERPL-CCNC: 23 U/L — SIGNIFICANT CHANGE UP (ref 0–41)
BASOPHILS # BLD AUTO: 0.05 K/UL — SIGNIFICANT CHANGE UP (ref 0–0.2)
BASOPHILS NFR BLD AUTO: 0.7 % — SIGNIFICANT CHANGE UP (ref 0–1)
BILIRUB DIRECT SERPL-MCNC: 0.5 MG/DL — HIGH (ref 0–0.3)
BILIRUB INDIRECT FLD-MCNC: 0.5 MG/DL — SIGNIFICANT CHANGE UP (ref 0.2–1.2)
BILIRUB SERPL-MCNC: 1 MG/DL — SIGNIFICANT CHANGE UP (ref 0.2–1.2)
BUN SERPL-MCNC: 8 MG/DL — LOW (ref 10–20)
CALCIUM SERPL-MCNC: 8.6 MG/DL — SIGNIFICANT CHANGE UP (ref 8.4–10.5)
CHLORIDE SERPL-SCNC: 104 MMOL/L — SIGNIFICANT CHANGE UP (ref 98–110)
CO2 SERPL-SCNC: 27 MMOL/L — SIGNIFICANT CHANGE UP (ref 17–32)
CREAT SERPL-MCNC: 0.8 MG/DL — SIGNIFICANT CHANGE UP (ref 0.7–1.5)
EGFR: 110 ML/MIN/1.73M2 — SIGNIFICANT CHANGE UP
EOSINOPHIL # BLD AUTO: 0.21 K/UL — SIGNIFICANT CHANGE UP (ref 0–0.7)
EOSINOPHIL NFR BLD AUTO: 3 % — SIGNIFICANT CHANGE UP (ref 0–8)
GLUCOSE SERPL-MCNC: 83 MG/DL — SIGNIFICANT CHANGE UP (ref 70–99)
HCT VFR BLD CALC: 34.5 % — LOW (ref 42–52)
HGB BLD-MCNC: 12.2 G/DL — LOW (ref 14–18)
IMM GRANULOCYTES NFR BLD AUTO: 0.6 % — HIGH (ref 0.1–0.3)
INR BLD: 1.1 RATIO — SIGNIFICANT CHANGE UP (ref 0.65–1.3)
LYMPHOCYTES # BLD AUTO: 2.29 K/UL — SIGNIFICANT CHANGE UP (ref 1.2–3.4)
LYMPHOCYTES # BLD AUTO: 32.9 % — SIGNIFICANT CHANGE UP (ref 20.5–51.1)
MAGNESIUM SERPL-MCNC: 1.8 MG/DL — SIGNIFICANT CHANGE UP (ref 1.8–2.4)
MCHC RBC-ENTMCNC: 29.8 PG — SIGNIFICANT CHANGE UP (ref 27–31)
MCHC RBC-ENTMCNC: 35.4 G/DL — SIGNIFICANT CHANGE UP (ref 32–37)
MCV RBC AUTO: 84.1 FL — SIGNIFICANT CHANGE UP (ref 80–94)
MONOCYTES # BLD AUTO: 0.44 K/UL — SIGNIFICANT CHANGE UP (ref 0.1–0.6)
MONOCYTES NFR BLD AUTO: 6.3 % — SIGNIFICANT CHANGE UP (ref 1.7–9.3)
NEUTROPHILS # BLD AUTO: 3.92 K/UL — SIGNIFICANT CHANGE UP (ref 1.4–6.5)
NEUTROPHILS NFR BLD AUTO: 56.5 % — SIGNIFICANT CHANGE UP (ref 42.2–75.2)
NRBC # BLD: 0 /100 WBCS — SIGNIFICANT CHANGE UP (ref 0–0)
PHOSPHATE SERPL-MCNC: 3.3 MG/DL — SIGNIFICANT CHANGE UP (ref 2.1–4.9)
PLATELET # BLD AUTO: 207 K/UL — SIGNIFICANT CHANGE UP (ref 130–400)
PMV BLD: 9.6 FL — SIGNIFICANT CHANGE UP (ref 7.4–10.4)
POTASSIUM SERPL-MCNC: 4.2 MMOL/L — SIGNIFICANT CHANGE UP (ref 3.5–5)
POTASSIUM SERPL-SCNC: 4.2 MMOL/L — SIGNIFICANT CHANGE UP (ref 3.5–5)
PROT SERPL-MCNC: 5.7 G/DL — LOW (ref 6–8)
PROTHROM AB SERPL-ACNC: 12.6 SEC — SIGNIFICANT CHANGE UP (ref 9.95–12.87)
RBC # BLD: 4.1 M/UL — LOW (ref 4.7–6.1)
RBC # FLD: 12.2 % — SIGNIFICANT CHANGE UP (ref 11.5–14.5)
SODIUM SERPL-SCNC: 143 MMOL/L — SIGNIFICANT CHANGE UP (ref 135–146)
WBC # BLD: 6.95 K/UL — SIGNIFICANT CHANGE UP (ref 4.8–10.8)
WBC # FLD AUTO: 6.95 K/UL — SIGNIFICANT CHANGE UP (ref 4.8–10.8)

## 2024-03-03 PROCEDURE — 99232 SBSQ HOSP IP/OBS MODERATE 35: CPT

## 2024-03-03 RX ORDER — MAGNESIUM SULFATE 500 MG/ML
2 VIAL (ML) INJECTION ONCE
Refills: 0 | Status: COMPLETED | OUTPATIENT
Start: 2024-03-03 | End: 2024-03-03

## 2024-03-03 RX ORDER — MAGNESIUM SULFATE 500 MG/ML
2 VIAL (ML) INJECTION ONCE
Refills: 0 | Status: COMPLETED | OUTPATIENT
Start: 2024-03-03 | End: 2024-03-04

## 2024-03-03 RX ADMIN — TRAMADOL HYDROCHLORIDE 50 MILLIGRAM(S): 50 TABLET ORAL at 23:00

## 2024-03-03 RX ADMIN — Medication 5 MILLIGRAM(S): at 13:21

## 2024-03-03 RX ADMIN — Medication 5 MILLIGRAM(S): at 05:45

## 2024-03-03 RX ADMIN — TRAMADOL HYDROCHLORIDE 50 MILLIGRAM(S): 50 TABLET ORAL at 17:51

## 2024-03-03 RX ADMIN — PANTOPRAZOLE SODIUM 40 MILLIGRAM(S): 20 TABLET, DELAYED RELEASE ORAL at 05:45

## 2024-03-03 RX ADMIN — TRAMADOL HYDROCHLORIDE 50 MILLIGRAM(S): 50 TABLET ORAL at 05:46

## 2024-03-03 RX ADMIN — AMPICILLIN SODIUM AND SULBACTAM SODIUM 200 GRAM(S): 250; 125 INJECTION, POWDER, FOR SUSPENSION INTRAMUSCULAR; INTRAVENOUS at 17:21

## 2024-03-03 RX ADMIN — GABAPENTIN 600 MILLIGRAM(S): 400 CAPSULE ORAL at 20:55

## 2024-03-03 RX ADMIN — SODIUM CHLORIDE 150 MILLILITER(S): 9 INJECTION, SOLUTION INTRAVENOUS at 12:10

## 2024-03-03 RX ADMIN — Medication 25 GRAM(S): at 05:44

## 2024-03-03 RX ADMIN — Medication 5 MILLIGRAM(S): at 20:55

## 2024-03-03 RX ADMIN — ENOXAPARIN SODIUM 40 MILLIGRAM(S): 100 INJECTION SUBCUTANEOUS at 12:11

## 2024-03-03 RX ADMIN — AMPICILLIN SODIUM AND SULBACTAM SODIUM 200 GRAM(S): 250; 125 INJECTION, POWDER, FOR SUSPENSION INTRAMUSCULAR; INTRAVENOUS at 12:10

## 2024-03-03 RX ADMIN — Medication 650 MILLIGRAM(S): at 12:10

## 2024-03-03 RX ADMIN — TRAMADOL HYDROCHLORIDE 50 MILLIGRAM(S): 50 TABLET ORAL at 17:21

## 2024-03-03 RX ADMIN — Medication 650 MILLIGRAM(S): at 12:40

## 2024-03-03 RX ADMIN — AMPICILLIN SODIUM AND SULBACTAM SODIUM 200 GRAM(S): 250; 125 INJECTION, POWDER, FOR SUSPENSION INTRAMUSCULAR; INTRAVENOUS at 05:44

## 2024-03-03 RX ADMIN — Medication 650 MILLIGRAM(S): at 17:51

## 2024-03-03 RX ADMIN — GABAPENTIN 600 MILLIGRAM(S): 400 CAPSULE ORAL at 05:45

## 2024-03-03 RX ADMIN — AMPICILLIN SODIUM AND SULBACTAM SODIUM 200 GRAM(S): 250; 125 INJECTION, POWDER, FOR SUSPENSION INTRAMUSCULAR; INTRAVENOUS at 23:01

## 2024-03-03 RX ADMIN — Medication 650 MILLIGRAM(S): at 17:21

## 2024-03-03 RX ADMIN — TRAMADOL HYDROCHLORIDE 50 MILLIGRAM(S): 50 TABLET ORAL at 12:40

## 2024-03-03 RX ADMIN — Medication 650 MILLIGRAM(S): at 05:45

## 2024-03-03 RX ADMIN — GABAPENTIN 600 MILLIGRAM(S): 400 CAPSULE ORAL at 13:21

## 2024-03-03 RX ADMIN — TRAMADOL HYDROCHLORIDE 50 MILLIGRAM(S): 50 TABLET ORAL at 12:10

## 2024-03-03 RX ADMIN — Medication 650 MILLIGRAM(S): at 23:00

## 2024-03-04 ENCOUNTER — RESULT REVIEW (OUTPATIENT)
Age: 49
End: 2024-03-04

## 2024-03-04 ENCOUNTER — TRANSCRIPTION ENCOUNTER (OUTPATIENT)
Age: 49
End: 2024-03-04

## 2024-03-04 LAB
ALBUMIN SERPL ELPH-MCNC: 3.7 G/DL — SIGNIFICANT CHANGE UP (ref 3.5–5.2)
ALP SERPL-CCNC: 194 U/L — HIGH (ref 30–115)
ALT FLD-CCNC: 102 U/L — HIGH (ref 0–41)
ANION GAP SERPL CALC-SCNC: 19 MMOL/L — HIGH (ref 7–14)
AST SERPL-CCNC: 66 U/L — HIGH (ref 0–41)
BASOPHILS # BLD AUTO: 0.04 K/UL — SIGNIFICANT CHANGE UP (ref 0–0.2)
BASOPHILS NFR BLD AUTO: 0.3 % — SIGNIFICANT CHANGE UP (ref 0–1)
BILIRUB DIRECT SERPL-MCNC: 1 MG/DL — HIGH (ref 0–0.3)
BILIRUB INDIRECT FLD-MCNC: 0.4 MG/DL — SIGNIFICANT CHANGE UP (ref 0.2–1.2)
BILIRUB SERPL-MCNC: 1.4 MG/DL — HIGH (ref 0.2–1.2)
BUN SERPL-MCNC: 8 MG/DL — LOW (ref 10–20)
CALCIUM SERPL-MCNC: 8.7 MG/DL — SIGNIFICANT CHANGE UP (ref 8.4–10.4)
CHLORIDE SERPL-SCNC: 99 MMOL/L — SIGNIFICANT CHANGE UP (ref 98–110)
CO2 SERPL-SCNC: 23 MMOL/L — SIGNIFICANT CHANGE UP (ref 17–32)
CREAT SERPL-MCNC: 0.9 MG/DL — SIGNIFICANT CHANGE UP (ref 0.7–1.5)
EGFR: 106 ML/MIN/1.73M2 — SIGNIFICANT CHANGE UP
EOSINOPHIL # BLD AUTO: 0 K/UL — SIGNIFICANT CHANGE UP (ref 0–0.7)
EOSINOPHIL NFR BLD AUTO: 0 % — SIGNIFICANT CHANGE UP (ref 0–8)
GLUCOSE SERPL-MCNC: 94 MG/DL — SIGNIFICANT CHANGE UP (ref 70–99)
HCT VFR BLD CALC: 42.4 % — SIGNIFICANT CHANGE UP (ref 42–52)
HGB BLD-MCNC: 14.5 G/DL — SIGNIFICANT CHANGE UP (ref 14–18)
IMM GRANULOCYTES NFR BLD AUTO: 0.5 % — HIGH (ref 0.1–0.3)
LYMPHOCYTES # BLD AUTO: 0.53 K/UL — LOW (ref 1.2–3.4)
LYMPHOCYTES # BLD AUTO: 3.5 % — LOW (ref 20.5–51.1)
MAGNESIUM SERPL-MCNC: 1.8 MG/DL — SIGNIFICANT CHANGE UP (ref 1.8–2.4)
MCHC RBC-ENTMCNC: 29.9 PG — SIGNIFICANT CHANGE UP (ref 27–31)
MCHC RBC-ENTMCNC: 34.2 G/DL — SIGNIFICANT CHANGE UP (ref 32–37)
MCV RBC AUTO: 87.4 FL — SIGNIFICANT CHANGE UP (ref 80–94)
MONOCYTES # BLD AUTO: 0.71 K/UL — HIGH (ref 0.1–0.6)
MONOCYTES NFR BLD AUTO: 4.7 % — SIGNIFICANT CHANGE UP (ref 1.7–9.3)
NEUTROPHILS # BLD AUTO: 13.83 K/UL — HIGH (ref 1.4–6.5)
NEUTROPHILS NFR BLD AUTO: 91 % — HIGH (ref 42.2–75.2)
NRBC # BLD: 0 /100 WBCS — SIGNIFICANT CHANGE UP (ref 0–0)
PHOSPHATE SERPL-MCNC: 4.1 MG/DL — SIGNIFICANT CHANGE UP (ref 2.1–4.9)
PLATELET # BLD AUTO: 257 K/UL — SIGNIFICANT CHANGE UP (ref 130–400)
PMV BLD: 9.5 FL — SIGNIFICANT CHANGE UP (ref 7.4–10.4)
POTASSIUM SERPL-MCNC: 4.6 MMOL/L — SIGNIFICANT CHANGE UP (ref 3.5–5)
POTASSIUM SERPL-SCNC: 4.6 MMOL/L — SIGNIFICANT CHANGE UP (ref 3.5–5)
PROT SERPL-MCNC: 6.4 G/DL — SIGNIFICANT CHANGE UP (ref 6–8)
RBC # BLD: 4.85 M/UL — SIGNIFICANT CHANGE UP (ref 4.7–6.1)
RBC # FLD: 12.6 % — SIGNIFICANT CHANGE UP (ref 11.5–14.5)
SODIUM SERPL-SCNC: 141 MMOL/L — SIGNIFICANT CHANGE UP (ref 135–146)
WBC # BLD: 15.18 K/UL — HIGH (ref 4.8–10.8)
WBC # FLD AUTO: 15.18 K/UL — HIGH (ref 4.8–10.8)

## 2024-03-04 PROCEDURE — 43262 ENDO CHOLANGIOPANCREATOGRAPH: CPT | Mod: XU

## 2024-03-04 PROCEDURE — 99233 SBSQ HOSP IP/OBS HIGH 50: CPT | Mod: 57,25

## 2024-03-04 PROCEDURE — 88304 TISSUE EXAM BY PATHOLOGIST: CPT | Mod: 26

## 2024-03-04 PROCEDURE — 74328 X-RAY BILE DUCT ENDOSCOPY: CPT | Mod: 26

## 2024-03-04 PROCEDURE — S2900 ROBOTIC SURGICAL SYSTEM: CPT | Mod: NC

## 2024-03-04 PROCEDURE — 47563 LAPARO CHOLECYSTECTOMY/GRAPH: CPT

## 2024-03-04 PROCEDURE — 43870 CLOSURE GASTROSTOMY SURGICAL: CPT

## 2024-03-04 PROCEDURE — 43264 ERCP REMOVE DUCT CALCULI: CPT

## 2024-03-04 RX ORDER — ENOXAPARIN SODIUM 100 MG/ML
40 INJECTION SUBCUTANEOUS EVERY 24 HOURS
Refills: 0 | Status: DISCONTINUED | OUTPATIENT
Start: 2024-03-05 | End: 2024-03-06

## 2024-03-04 RX ORDER — BACLOFEN 100 %
5 POWDER (GRAM) MISCELLANEOUS EVERY 8 HOURS
Refills: 0 | Status: DISCONTINUED | OUTPATIENT
Start: 2024-03-04 | End: 2024-03-06

## 2024-03-04 RX ORDER — HYDROMORPHONE HYDROCHLORIDE 2 MG/ML
0.5 INJECTION INTRAMUSCULAR; INTRAVENOUS; SUBCUTANEOUS
Refills: 0 | Status: DISCONTINUED | OUTPATIENT
Start: 2024-03-04 | End: 2024-03-04

## 2024-03-04 RX ORDER — INDOCYANINE GREEN 25 MG
2.5 KIT INTRAVASCULAR; INTRAVENOUS ONCE
Refills: 0 | Status: DISCONTINUED | OUTPATIENT
Start: 2024-03-04 | End: 2024-03-05

## 2024-03-04 RX ORDER — PANTOPRAZOLE SODIUM 20 MG/1
40 TABLET, DELAYED RELEASE ORAL
Refills: 0 | Status: DISCONTINUED | OUTPATIENT
Start: 2024-03-04 | End: 2024-03-06

## 2024-03-04 RX ORDER — ONDANSETRON 8 MG/1
4 TABLET, FILM COATED ORAL ONCE
Refills: 0 | Status: COMPLETED | OUTPATIENT
Start: 2024-03-04 | End: 2024-03-04

## 2024-03-04 RX ORDER — TRAMADOL HYDROCHLORIDE 50 MG/1
50 TABLET ORAL
Refills: 0 | Status: DISCONTINUED | OUTPATIENT
Start: 2024-03-04 | End: 2024-03-06

## 2024-03-04 RX ORDER — ACETAMINOPHEN 500 MG
650 TABLET ORAL EVERY 6 HOURS
Refills: 0 | Status: DISCONTINUED | OUTPATIENT
Start: 2024-03-04 | End: 2024-03-06

## 2024-03-04 RX ORDER — SODIUM CHLORIDE 9 MG/ML
1000 INJECTION, SOLUTION INTRAVENOUS
Refills: 0 | Status: DISCONTINUED | OUTPATIENT
Start: 2024-03-04 | End: 2024-03-06

## 2024-03-04 RX ORDER — GABAPENTIN 400 MG/1
600 CAPSULE ORAL THREE TIMES A DAY
Refills: 0 | Status: DISCONTINUED | OUTPATIENT
Start: 2024-03-04 | End: 2024-03-06

## 2024-03-04 RX ORDER — MAGNESIUM SULFATE 500 MG/ML
1 VIAL (ML) INJECTION ONCE
Refills: 0 | Status: COMPLETED | OUTPATIENT
Start: 2024-03-04 | End: 2024-03-05

## 2024-03-04 RX ORDER — GLUCAGON INJECTION, SOLUTION 0.5 MG/.1ML
2 INJECTION, SOLUTION SUBCUTANEOUS ONCE
Refills: 0 | Status: DISCONTINUED | OUTPATIENT
Start: 2024-03-04 | End: 2024-03-05

## 2024-03-04 RX ADMIN — HYDROMORPHONE HYDROCHLORIDE 0.5 MILLIGRAM(S): 2 INJECTION INTRAMUSCULAR; INTRAVENOUS; SUBCUTANEOUS at 17:20

## 2024-03-04 RX ADMIN — Medication 5 MILLIGRAM(S): at 22:26

## 2024-03-04 RX ADMIN — Medication 650 MILLIGRAM(S): at 17:00

## 2024-03-04 RX ADMIN — HYDROMORPHONE HYDROCHLORIDE 0.5 MILLIGRAM(S): 2 INJECTION INTRAMUSCULAR; INTRAVENOUS; SUBCUTANEOUS at 16:45

## 2024-03-04 RX ADMIN — TRAMADOL HYDROCHLORIDE 50 MILLIGRAM(S): 50 TABLET ORAL at 17:30

## 2024-03-04 RX ADMIN — Medication 650 MILLIGRAM(S): at 23:24

## 2024-03-04 RX ADMIN — GABAPENTIN 600 MILLIGRAM(S): 400 CAPSULE ORAL at 05:26

## 2024-03-04 RX ADMIN — TRAMADOL HYDROCHLORIDE 50 MILLIGRAM(S): 50 TABLET ORAL at 23:24

## 2024-03-04 RX ADMIN — TRAMADOL HYDROCHLORIDE 50 MILLIGRAM(S): 50 TABLET ORAL at 00:00

## 2024-03-04 RX ADMIN — TRAMADOL HYDROCHLORIDE 50 MILLIGRAM(S): 50 TABLET ORAL at 06:20

## 2024-03-04 RX ADMIN — HYDROMORPHONE HYDROCHLORIDE 0.5 MILLIGRAM(S): 2 INJECTION INTRAMUSCULAR; INTRAVENOUS; SUBCUTANEOUS at 17:00

## 2024-03-04 RX ADMIN — HYDROMORPHONE HYDROCHLORIDE 0.5 MILLIGRAM(S): 2 INJECTION INTRAMUSCULAR; INTRAVENOUS; SUBCUTANEOUS at 16:30

## 2024-03-04 RX ADMIN — ONDANSETRON 4 MILLIGRAM(S): 8 TABLET, FILM COATED ORAL at 16:47

## 2024-03-04 RX ADMIN — HYDROMORPHONE HYDROCHLORIDE 0.5 MILLIGRAM(S): 2 INJECTION INTRAMUSCULAR; INTRAVENOUS; SUBCUTANEOUS at 17:30

## 2024-03-04 RX ADMIN — SODIUM CHLORIDE 150 MILLILITER(S): 9 INJECTION, SOLUTION INTRAVENOUS at 17:20

## 2024-03-04 RX ADMIN — TRAMADOL HYDROCHLORIDE 50 MILLIGRAM(S): 50 TABLET ORAL at 17:00

## 2024-03-04 RX ADMIN — AMPICILLIN SODIUM AND SULBACTAM SODIUM 200 GRAM(S): 250; 125 INJECTION, POWDER, FOR SUSPENSION INTRAMUSCULAR; INTRAVENOUS at 05:28

## 2024-03-04 RX ADMIN — HYDROMORPHONE HYDROCHLORIDE 0.5 MILLIGRAM(S): 2 INJECTION INTRAMUSCULAR; INTRAVENOUS; SUBCUTANEOUS at 17:15

## 2024-03-04 RX ADMIN — Medication 650 MILLIGRAM(S): at 00:00

## 2024-03-04 RX ADMIN — GABAPENTIN 600 MILLIGRAM(S): 400 CAPSULE ORAL at 22:25

## 2024-03-04 RX ADMIN — Medication 5 MILLIGRAM(S): at 05:27

## 2024-03-04 RX ADMIN — Medication 650 MILLIGRAM(S): at 06:20

## 2024-03-04 RX ADMIN — TRAMADOL HYDROCHLORIDE 50 MILLIGRAM(S): 50 TABLET ORAL at 05:26

## 2024-03-04 RX ADMIN — Medication 650 MILLIGRAM(S): at 05:27

## 2024-03-04 RX ADMIN — Medication 25 GRAM(S): at 01:14

## 2024-03-04 RX ADMIN — HYDROMORPHONE HYDROCHLORIDE 0.5 MILLIGRAM(S): 2 INJECTION INTRAMUSCULAR; INTRAVENOUS; SUBCUTANEOUS at 17:05

## 2024-03-04 RX ADMIN — Medication 650 MILLIGRAM(S): at 17:30

## 2024-03-04 RX ADMIN — PANTOPRAZOLE SODIUM 40 MILLIGRAM(S): 20 TABLET, DELAYED RELEASE ORAL at 05:27
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
## 2024-03-04 NOTE — BRIEF OPERATIVE NOTE - OPERATION/FINDINGS
Robotic cholecystectomy with IOC. IOC positive for filling defect of the distal CBD. 15mm port placed through anterior gastrotomy. Transgastric ERCP by advanced GI, sphincterotomy performed with removal of 2 large stones.

## 2024-03-04 NOTE — BRIEF OPERATIVE NOTE - NSICDXBRIEFPROCEDURE_GEN_ALL_CORE_FT
PROCEDURES:  Gastrotomy, laparoscopic, for ERCP 04-Mar-2024 16:37:24  Henrique Fajardo  Cholecystectomy, robot-assisted, laparoscopic, using da Tayler Xi, with cholangiogram if indicated 04-Mar-2024 16:38:49  Henrique Fajardo

## 2024-03-05 ENCOUNTER — APPOINTMENT (OUTPATIENT)
Dept: ORTHOPEDIC SURGERY | Facility: CLINIC | Age: 49
End: 2024-03-05

## 2024-03-05 LAB
ALBUMIN SERPL ELPH-MCNC: 3.1 G/DL — LOW (ref 3.5–5.2)
ALBUMIN SERPL ELPH-MCNC: 3.4 G/DL — LOW (ref 3.5–5.2)
ALP SERPL-CCNC: 136 U/L — HIGH (ref 30–115)
ALP SERPL-CCNC: 155 U/L — HIGH (ref 30–115)
ALT FLD-CCNC: 65 U/L — HIGH (ref 0–41)
ALT FLD-CCNC: 75 U/L — HIGH (ref 0–41)
ANION GAP SERPL CALC-SCNC: 12 MMOL/L — SIGNIFICANT CHANGE UP (ref 7–14)
ANION GAP SERPL CALC-SCNC: 13 MMOL/L — SIGNIFICANT CHANGE UP (ref 7–14)
AST SERPL-CCNC: 39 U/L — SIGNIFICANT CHANGE UP (ref 0–41)
AST SERPL-CCNC: 43 U/L — HIGH (ref 0–41)
BASOPHILS # BLD AUTO: 0.03 K/UL — SIGNIFICANT CHANGE UP (ref 0–0.2)
BASOPHILS # BLD AUTO: 0.04 K/UL — SIGNIFICANT CHANGE UP (ref 0–0.2)
BASOPHILS NFR BLD AUTO: 0.2 % — SIGNIFICANT CHANGE UP (ref 0–1)
BASOPHILS NFR BLD AUTO: 0.3 % — SIGNIFICANT CHANGE UP (ref 0–1)
BILIRUB DIRECT SERPL-MCNC: 0.3 MG/DL — SIGNIFICANT CHANGE UP (ref 0–0.3)
BILIRUB DIRECT SERPL-MCNC: 0.4 MG/DL — HIGH (ref 0–0.3)
BILIRUB INDIRECT FLD-MCNC: 0.5 MG/DL — SIGNIFICANT CHANGE UP (ref 0.2–1.2)
BILIRUB INDIRECT FLD-MCNC: 0.5 MG/DL — SIGNIFICANT CHANGE UP (ref 0.2–1.2)
BILIRUB SERPL-MCNC: 0.8 MG/DL — SIGNIFICANT CHANGE UP (ref 0.2–1.2)
BILIRUB SERPL-MCNC: 0.9 MG/DL — SIGNIFICANT CHANGE UP (ref 0.2–1.2)
BUN SERPL-MCNC: 7 MG/DL — LOW (ref 10–20)
BUN SERPL-MCNC: 7 MG/DL — LOW (ref 10–20)
CALCIUM SERPL-MCNC: 8.2 MG/DL — LOW (ref 8.4–10.5)
CALCIUM SERPL-MCNC: 8.3 MG/DL — LOW (ref 8.4–10.5)
CHLORIDE SERPL-SCNC: 102 MMOL/L — SIGNIFICANT CHANGE UP (ref 98–110)
CHLORIDE SERPL-SCNC: 102 MMOL/L — SIGNIFICANT CHANGE UP (ref 98–110)
CO2 SERPL-SCNC: 22 MMOL/L — SIGNIFICANT CHANGE UP (ref 17–32)
CO2 SERPL-SCNC: 27 MMOL/L — SIGNIFICANT CHANGE UP (ref 17–32)
CREAT SERPL-MCNC: 0.9 MG/DL — SIGNIFICANT CHANGE UP (ref 0.7–1.5)
CREAT SERPL-MCNC: 0.9 MG/DL — SIGNIFICANT CHANGE UP (ref 0.7–1.5)
EGFR: 106 ML/MIN/1.73M2 — SIGNIFICANT CHANGE UP
EGFR: 106 ML/MIN/1.73M2 — SIGNIFICANT CHANGE UP
EOSINOPHIL # BLD AUTO: 0.01 K/UL — SIGNIFICANT CHANGE UP (ref 0–0.7)
EOSINOPHIL # BLD AUTO: 0.02 K/UL — SIGNIFICANT CHANGE UP (ref 0–0.7)
EOSINOPHIL NFR BLD AUTO: 0.1 % — SIGNIFICANT CHANGE UP (ref 0–8)
EOSINOPHIL NFR BLD AUTO: 0.1 % — SIGNIFICANT CHANGE UP (ref 0–8)
GLUCOSE SERPL-MCNC: 103 MG/DL — HIGH (ref 70–99)
GLUCOSE SERPL-MCNC: 86 MG/DL — SIGNIFICANT CHANGE UP (ref 70–99)
HCT VFR BLD CALC: 36 % — LOW (ref 42–52)
HCT VFR BLD CALC: 37.7 % — LOW (ref 42–52)
HGB BLD-MCNC: 12.5 G/DL — LOW (ref 14–18)
HGB BLD-MCNC: 13.1 G/DL — LOW (ref 14–18)
IMM GRANULOCYTES NFR BLD AUTO: 0.4 % — HIGH (ref 0.1–0.3)
IMM GRANULOCYTES NFR BLD AUTO: 0.4 % — HIGH (ref 0.1–0.3)
LYMPHOCYTES # BLD AUTO: 0.93 K/UL — LOW (ref 1.2–3.4)
LYMPHOCYTES # BLD AUTO: 1.47 K/UL — SIGNIFICANT CHANGE UP (ref 1.2–3.4)
LYMPHOCYTES # BLD AUTO: 6.5 % — LOW (ref 20.5–51.1)
LYMPHOCYTES # BLD AUTO: 9.8 % — LOW (ref 20.5–51.1)
MAGNESIUM SERPL-MCNC: 1.7 MG/DL — LOW (ref 1.8–2.4)
MAGNESIUM SERPL-MCNC: 1.9 MG/DL — SIGNIFICANT CHANGE UP (ref 1.8–2.4)
MCHC RBC-ENTMCNC: 30 PG — SIGNIFICANT CHANGE UP (ref 27–31)
MCHC RBC-ENTMCNC: 30 PG — SIGNIFICANT CHANGE UP (ref 27–31)
MCHC RBC-ENTMCNC: 34.7 G/DL — SIGNIFICANT CHANGE UP (ref 32–37)
MCHC RBC-ENTMCNC: 34.7 G/DL — SIGNIFICANT CHANGE UP (ref 32–37)
MCV RBC AUTO: 86.3 FL — SIGNIFICANT CHANGE UP (ref 80–94)
MCV RBC AUTO: 86.3 FL — SIGNIFICANT CHANGE UP (ref 80–94)
MONOCYTES # BLD AUTO: 0.47 K/UL — SIGNIFICANT CHANGE UP (ref 0.1–0.6)
MONOCYTES # BLD AUTO: 0.5 K/UL — SIGNIFICANT CHANGE UP (ref 0.1–0.6)
MONOCYTES NFR BLD AUTO: 3.1 % — SIGNIFICANT CHANGE UP (ref 1.7–9.3)
MONOCYTES NFR BLD AUTO: 3.5 % — SIGNIFICANT CHANGE UP (ref 1.7–9.3)
NEUTROPHILS # BLD AUTO: 12.71 K/UL — HIGH (ref 1.4–6.5)
NEUTROPHILS # BLD AUTO: 12.98 K/UL — HIGH (ref 1.4–6.5)
NEUTROPHILS NFR BLD AUTO: 86.3 % — HIGH (ref 42.2–75.2)
NEUTROPHILS NFR BLD AUTO: 89.3 % — HIGH (ref 42.2–75.2)
NRBC # BLD: 0 /100 WBCS — SIGNIFICANT CHANGE UP (ref 0–0)
NRBC # BLD: 0 /100 WBCS — SIGNIFICANT CHANGE UP (ref 0–0)
PHOSPHATE SERPL-MCNC: 2.1 MG/DL — SIGNIFICANT CHANGE UP (ref 2.1–4.9)
PHOSPHATE SERPL-MCNC: 3 MG/DL — SIGNIFICANT CHANGE UP (ref 2.1–4.9)
PLATELET # BLD AUTO: 229 K/UL — SIGNIFICANT CHANGE UP (ref 130–400)
PLATELET # BLD AUTO: 243 K/UL — SIGNIFICANT CHANGE UP (ref 130–400)
PMV BLD: 9.5 FL — SIGNIFICANT CHANGE UP (ref 7.4–10.4)
PMV BLD: 9.6 FL — SIGNIFICANT CHANGE UP (ref 7.4–10.4)
POTASSIUM SERPL-MCNC: 4.1 MMOL/L — SIGNIFICANT CHANGE UP (ref 3.5–5)
POTASSIUM SERPL-MCNC: 4.2 MMOL/L — SIGNIFICANT CHANGE UP (ref 3.5–5)
POTASSIUM SERPL-SCNC: 4.1 MMOL/L — SIGNIFICANT CHANGE UP (ref 3.5–5)
POTASSIUM SERPL-SCNC: 4.2 MMOL/L — SIGNIFICANT CHANGE UP (ref 3.5–5)
PROT SERPL-MCNC: 5.6 G/DL — LOW (ref 6–8)
PROT SERPL-MCNC: 5.8 G/DL — LOW (ref 6–8)
RBC # BLD: 4.17 M/UL — LOW (ref 4.7–6.1)
RBC # BLD: 4.37 M/UL — LOW (ref 4.7–6.1)
RBC # FLD: 12.7 % — SIGNIFICANT CHANGE UP (ref 11.5–14.5)
RBC # FLD: 12.8 % — SIGNIFICANT CHANGE UP (ref 11.5–14.5)
SODIUM SERPL-SCNC: 137 MMOL/L — SIGNIFICANT CHANGE UP (ref 135–146)
SODIUM SERPL-SCNC: 141 MMOL/L — SIGNIFICANT CHANGE UP (ref 135–146)
WBC # BLD: 14.24 K/UL — HIGH (ref 4.8–10.8)
WBC # BLD: 15.04 K/UL — HIGH (ref 4.8–10.8)
WBC # FLD AUTO: 14.24 K/UL — HIGH (ref 4.8–10.8)
WBC # FLD AUTO: 15.04 K/UL — HIGH (ref 4.8–10.8)

## 2024-03-05 PROCEDURE — 99233 SBSQ HOSP IP/OBS HIGH 50: CPT

## 2024-03-05 RX ORDER — AMPICILLIN SODIUM AND SULBACTAM SODIUM 250; 125 MG/ML; MG/ML
INJECTION, POWDER, FOR SUSPENSION INTRAMUSCULAR; INTRAVENOUS
Refills: 0 | Status: DISCONTINUED | OUTPATIENT
Start: 2024-03-05 | End: 2024-03-06

## 2024-03-05 RX ORDER — ACETAMINOPHEN 500 MG
650 TABLET ORAL ONCE
Refills: 0 | Status: COMPLETED | OUTPATIENT
Start: 2024-03-05 | End: 2024-03-05

## 2024-03-05 RX ORDER — TRAMADOL HYDROCHLORIDE 50 MG/1
50 TABLET ORAL ONCE
Refills: 0 | Status: DISCONTINUED | OUTPATIENT
Start: 2024-03-05 | End: 2024-03-05

## 2024-03-05 RX ORDER — AMPICILLIN SODIUM AND SULBACTAM SODIUM 250; 125 MG/ML; MG/ML
3 INJECTION, POWDER, FOR SUSPENSION INTRAMUSCULAR; INTRAVENOUS EVERY 6 HOURS
Refills: 0 | Status: DISCONTINUED | OUTPATIENT
Start: 2024-03-05 | End: 2024-03-06

## 2024-03-05 RX ORDER — AMPICILLIN SODIUM AND SULBACTAM SODIUM 250; 125 MG/ML; MG/ML
3 INJECTION, POWDER, FOR SUSPENSION INTRAMUSCULAR; INTRAVENOUS ONCE
Refills: 0 | Status: COMPLETED | OUTPATIENT
Start: 2024-03-05 | End: 2024-03-05

## 2024-03-05 RX ADMIN — TRAMADOL HYDROCHLORIDE 50 MILLIGRAM(S): 50 TABLET ORAL at 13:03

## 2024-03-05 RX ADMIN — GABAPENTIN 600 MILLIGRAM(S): 400 CAPSULE ORAL at 23:39

## 2024-03-05 RX ADMIN — GABAPENTIN 600 MILLIGRAM(S): 400 CAPSULE ORAL at 13:04

## 2024-03-05 RX ADMIN — Medication 650 MILLIGRAM(S): at 14:10

## 2024-03-05 RX ADMIN — AMPICILLIN SODIUM AND SULBACTAM SODIUM 200 GRAM(S): 250; 125 INJECTION, POWDER, FOR SUSPENSION INTRAMUSCULAR; INTRAVENOUS at 23:37

## 2024-03-05 RX ADMIN — Medication 650 MILLIGRAM(S): at 09:02

## 2024-03-05 RX ADMIN — Medication 650 MILLIGRAM(S): at 10:00

## 2024-03-05 RX ADMIN — TRAMADOL HYDROCHLORIDE 50 MILLIGRAM(S): 50 TABLET ORAL at 10:00

## 2024-03-05 RX ADMIN — TRAMADOL HYDROCHLORIDE 50 MILLIGRAM(S): 50 TABLET ORAL at 23:38

## 2024-03-05 RX ADMIN — Medication 5 MILLIGRAM(S): at 13:03

## 2024-03-05 RX ADMIN — Medication 650 MILLIGRAM(S): at 17:17

## 2024-03-05 RX ADMIN — Medication 5 MILLIGRAM(S): at 23:38

## 2024-03-05 RX ADMIN — TRAMADOL HYDROCHLORIDE 50 MILLIGRAM(S): 50 TABLET ORAL at 14:10

## 2024-03-05 RX ADMIN — Medication 100 GRAM(S): at 05:49

## 2024-03-05 RX ADMIN — SODIUM CHLORIDE 150 MILLILITER(S): 9 INJECTION, SOLUTION INTRAVENOUS at 05:02

## 2024-03-05 RX ADMIN — AMPICILLIN SODIUM AND SULBACTAM SODIUM 200 GRAM(S): 250; 125 INJECTION, POWDER, FOR SUSPENSION INTRAMUSCULAR; INTRAVENOUS at 09:02

## 2024-03-05 RX ADMIN — TRAMADOL HYDROCHLORIDE 50 MILLIGRAM(S): 50 TABLET ORAL at 17:16

## 2024-03-05 RX ADMIN — Medication 650 MILLIGRAM(S): at 23:37

## 2024-03-05 RX ADMIN — TRAMADOL HYDROCHLORIDE 50 MILLIGRAM(S): 50 TABLET ORAL at 09:02

## 2024-03-05 RX ADMIN — Medication 650 MILLIGRAM(S): at 13:04

## 2024-03-05 RX ADMIN — AMPICILLIN SODIUM AND SULBACTAM SODIUM 200 GRAM(S): 250; 125 INJECTION, POWDER, FOR SUSPENSION INTRAMUSCULAR; INTRAVENOUS at 17:17

## 2024-03-05 RX ADMIN — AMPICILLIN SODIUM AND SULBACTAM SODIUM 200 GRAM(S): 250; 125 INJECTION, POWDER, FOR SUSPENSION INTRAMUSCULAR; INTRAVENOUS at 13:05

## 2024-03-05 RX ADMIN — TRAMADOL HYDROCHLORIDE 50 MILLIGRAM(S): 50 TABLET ORAL at 19:15

## 2024-03-05 NOTE — PROGRESS NOTE ADULT - NS ATTEND AMEND GEN_ALL_CORE FT
Agree with above.  Patient with choledocholithiasis status post lap assisted ERCP with stone removal.  Doing well today and tolerating diet.    Reported postprandial diarrhea which is not unusual in the postoperative phase and is likely induced by bile acid.  Supportive care per primary team. If patient has persistent diarrhea, may consider cholestyramine daily as needed.  Rest of recommendations per the note above.

## 2024-03-05 NOTE — PROGRESS NOTE ADULT - ASSESSMENT
Patient is a 48-year-old gentleman with a past medical history of hypertension, obesity requiring laparoscopic Samuel-en-Y, COPD, ABI, fatty liver, and chronic pain from prior trauma in which she had multiple orthopedic interventions whom presented from home for abdominal pain. Patient is status post port assisted ERCP with sphincterotomy and removal of 2 large common bile duct stones along with cholecystectomy, a joint procedure between MIS and advanced gastroenterology.  Patient is a little groggy this morning and has some pain but had no issues overnight.      Pancreatitis / Cholelithiasis with large distended Gallbladder / LFT abnormalities/ IPMN/ S/P Lap Assisted ERCP with sphincterotomy and removal of CBD stones   - Appreciate MIS Teams care and intervention  - Discussed ERCP findings and portion with patient   - Pain control  - IV hydration  - PO advancement as per primary team  - Pantoprazole 40mg daily to be continue for two weeks upon discharge   - Will need follow up on IPMN in one year

## 2024-03-06 ENCOUNTER — TRANSCRIPTION ENCOUNTER (OUTPATIENT)
Age: 49
End: 2024-03-06

## 2024-03-06 VITALS
OXYGEN SATURATION: 98 % | TEMPERATURE: 99 F | HEART RATE: 86 BPM | DIASTOLIC BLOOD PRESSURE: 96 MMHG | RESPIRATION RATE: 18 BRPM | SYSTOLIC BLOOD PRESSURE: 143 MMHG

## 2024-03-06 RX ORDER — POTASSIUM PHOSPHATE, MONOBASIC POTASSIUM PHOSPHATE, DIBASIC 236; 224 MG/ML; MG/ML
30 INJECTION, SOLUTION INTRAVENOUS ONCE
Refills: 0 | Status: COMPLETED | OUTPATIENT
Start: 2024-03-06 | End: 2024-03-06

## 2024-03-06 RX ORDER — ACETAMINOPHEN 500 MG
2 TABLET ORAL
Qty: 0 | Refills: 0 | DISCHARGE
Start: 2024-03-06

## 2024-03-06 RX ORDER — OMEPRAZOLE 10 MG/1
2 CAPSULE, DELAYED RELEASE ORAL
Qty: 28 | Refills: 0
Start: 2024-03-06 | End: 2024-03-19

## 2024-03-06 RX ORDER — PANTOPRAZOLE SODIUM 20 MG/1
1 TABLET, DELAYED RELEASE ORAL
Refills: 0
Start: 2024-03-06

## 2024-03-06 RX ORDER — MAGNESIUM SULFATE 500 MG/ML
2 VIAL (ML) INJECTION ONCE
Refills: 0 | Status: COMPLETED | OUTPATIENT
Start: 2024-03-06 | End: 2024-03-06

## 2024-03-06 RX ADMIN — AMPICILLIN SODIUM AND SULBACTAM SODIUM 200 GRAM(S): 250; 125 INJECTION, POWDER, FOR SUSPENSION INTRAMUSCULAR; INTRAVENOUS at 05:40

## 2024-03-06 RX ADMIN — TRAMADOL HYDROCHLORIDE 50 MILLIGRAM(S): 50 TABLET ORAL at 11:43

## 2024-03-06 RX ADMIN — TRAMADOL HYDROCHLORIDE 50 MILLIGRAM(S): 50 TABLET ORAL at 05:40

## 2024-03-06 RX ADMIN — Medication 650 MILLIGRAM(S): at 05:40

## 2024-03-06 RX ADMIN — GABAPENTIN 600 MILLIGRAM(S): 400 CAPSULE ORAL at 05:40

## 2024-03-06 RX ADMIN — PANTOPRAZOLE SODIUM 40 MILLIGRAM(S): 20 TABLET, DELAYED RELEASE ORAL at 05:41

## 2024-03-06 RX ADMIN — Medication 5 MILLIGRAM(S): at 13:03

## 2024-03-06 RX ADMIN — TRAMADOL HYDROCHLORIDE 50 MILLIGRAM(S): 50 TABLET ORAL at 12:00

## 2024-03-06 RX ADMIN — POTASSIUM PHOSPHATE, MONOBASIC POTASSIUM PHOSPHATE, DIBASIC 83.33 MILLIMOLE(S): 236; 224 INJECTION, SOLUTION INTRAVENOUS at 01:17

## 2024-03-06 RX ADMIN — Medication 25 GRAM(S): at 01:45

## 2024-03-06 RX ADMIN — Medication 650 MILLIGRAM(S): at 12:00

## 2024-03-06 RX ADMIN — AMPICILLIN SODIUM AND SULBACTAM SODIUM 200 GRAM(S): 250; 125 INJECTION, POWDER, FOR SUSPENSION INTRAMUSCULAR; INTRAVENOUS at 11:43

## 2024-03-06 RX ADMIN — GABAPENTIN 600 MILLIGRAM(S): 400 CAPSULE ORAL at 13:04

## 2024-03-06 RX ADMIN — Medication 650 MILLIGRAM(S): at 11:43

## 2024-03-06 RX ADMIN — Medication 5 MILLIGRAM(S): at 05:40

## 2024-03-06 NOTE — DISCHARGE NOTE PROVIDER - NSDCMRMEDTOKEN_GEN_ALL_CORE_FT
amoxicillin-clavulanate 875 mg-125 mg oral tablet: 875 milligram(s) orally 2 times a day  baclofen 5 mg oral tablet: 1 tab(s) orally 3 times a day, As Needed  gabapentin 600 mg oral tablet: 1 tab(s) orally 3 times a day  traMADol 50 mg oral tablet: 1 tab(s) orally every 4 hours  Tylenol 325 mg oral tablet: 2 tab(s) orally every 6 hours

## 2024-03-06 NOTE — DISCHARGE NOTE PROVIDER - CARE PROVIDER_API CALL
Amol cMdonald  Surgery  [address] [place] [address], [facility] [address] [place] [address]  Phone: [phone]  Fax: [phone]  Follow Up Time: 2 weeks   Amol Mcdonald  Surgery  [address] [place] [address], [facility] [address] [place] [address]  Phone: [phone]  Fax: [phone]  Follow Up Time: 1 week

## 2024-03-06 NOTE — DISCHARGE NOTE PROVIDER - PROVIDER TOKENS
PROVIDER:[TOKEN:[[id number]],FOLLOWUP:[2 weeks]] PROVIDER:[TOKEN:[[id number]],FOLLOWUP:[1 week]]

## 2024-03-06 NOTE — DISCHARGE NOTE PROVIDER - NSDCACTIVITY_GEN_ALL_CORE
The patient is a 69y Female complaining of lower leg pain/injury. Showering allowed/Stairs allowed/Walking - Indoors allowed/No heavy lifting/straining/Walking - Outdoors allowed/Follow Instructions Provided by your Surgical Team

## 2024-03-06 NOTE — DISCHARGE NOTE PROVIDER - NSDCCPCAREPLAN_GEN_ALL_CORE_FT
PRINCIPAL DISCHARGE DIAGNOSIS  Diagnosis: Cholangitis  Assessment and Plan of Treatment: SURGERY DISCHARGE INSTRUCTIONS  FOLLOW-UP - with Dr. Mcdonald in 2 weeks. Call the office to make an appointment or if you have any questions/concerns.  DIET - regular.   ACTIVITY- No heavy lifting for 4-6 wks over 10-20 lbs. Walking is encouraged. No running or swimming. No driving while taking pain medication.  WOUNDCARE - Some drainage from your incisions or drain sites is normal. If you have drainage from an open incision or drain site- cover loosely with sterile gauze and tape and change daily. You have no bandages but have purple glue over your incisions instead, this will come off with time. May shower after surgery but no submerging wound under water for 2 weeks (tub bathing). Pat area dry when wet, keep clean and dry. Do not apply powders or lotion to wound area.   PAIN MEDS - Take over the counter extra strength tylenol 500mg and with food every 6 hours for pain.  No more than 4g of tylenol in 24hrs or 1g in 4 hrs. No mixing alcohol with prescription pain meds.   ANTIBIOTICS- Take antibiotics as directed. Augmentin 875mg two times daily  OTHER MEDS - If you have any questions about your other regular home medications please call your primary care physician or the physician who prescribed those medications to you.   If you develop fever, dizziness, chest pain, trouble breathing, nausea, vomiting, increasing abdominal pain, inability to pass bowel movements, redness/pain/discharge from incisions. Please call the office or go to the emergency room immediately.        SECONDARY DISCHARGE DIAGNOSES  Diagnosis: Urinary tract infection  Assessment and Plan of Treatment:      PRINCIPAL DISCHARGE DIAGNOSIS  Diagnosis: Cholangitis  Assessment and Plan of Treatment: SURGERY DISCHARGE INSTRUCTIONS  FOLLOW-UP - with Dr. Mcdonald in 2 weeks. Call the office to make an appointment or if you have any questions/concerns.  DIET - regular.   ACTIVITY- No heavy lifting for 4-6 wks over 10-20 lbs. Walking is encouraged. No running or swimming. No driving while taking pain medication.  WOUNDCARE - Some drainage from your incisions or drain sites is normal. If you have drainage from an open incision or drain site- cover loosely with sterile gauze and tape and change daily. You have no bandages but have purple glue over your incisions instead, this will come off with time. May shower after surgery but no submerging wound under water for 2 weeks (tub bathing). Pat area dry when wet, keep clean and dry. Do not apply powders or lotion to wound area.   PAIN MEDS - Take over the counter extra strength tylenol 500mg and with food every 6 hours for pain.  No more than 4g of tylenol in 24hrs or 1g in 4 hrs. No mixing alcohol with prescription pain meds. Continue to take your home Tramadol, Gabapentin, and Baclofen as directed.   ANTIBIOTICS- Take antibiotics as directed. Augmentin 875mg two times daily  OTHER MEDS - If you have any questions about your other regular home medications please call your primary care physician or the physician who prescribed those medications to you.   If you develop fever, dizziness, chest pain, trouble breathing, nausea, vomiting, increasing abdominal pain, inability to pass bowel movements, redness/pain/discharge from incisions. Please call the office or go to the emergency room immediately.        SECONDARY DISCHARGE DIAGNOSES  Diagnosis: Urinary tract infection  Assessment and Plan of Treatment:
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
## 2024-03-06 NOTE — DISCHARGE NOTE NURSING/CASE MANAGEMENT/SOCIAL WORK - PATIENT PORTAL LINK FT
You can access the FollowMyHealth Patient Portal offered by [facility] by registering at the following website: [email or web address]. By joining [facility]’s FollowMyHealth portal, you will also be able to view your health information using other applications (apps) compatible with our system.

## 2024-03-06 NOTE — PROGRESS NOTE ADULT - SUBJECTIVE AND OBJECTIVE BOX
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 tunnel release of both wrists 06/2018 RIght  H/O gastric bypass 9/2021      MEDICATIONS  (STANDING):  acetaminophen     Tablet .. 650 milliGRAM(s) Oral every 6 hours  baclofen 5 milliGRAM(s) Oral every 8 hours  enoxaparin Injectable 40 milliGRAM(s) SubCutaneous every 24 hours  gabapentin 600 milliGRAM(s) Oral three times a day  lactated ringers. 1000 milliLiter(s) (150 mL/Hr) IV Continuous <Continuous>  traMADol 50 milliGRAM(s) Oral four times a day      Allergies  No Known Allergies      Review of Systems  General:  Denies Fatigue, Denies Fever, Denies Weakness ,Denies Weight Loss   HEENT: Denies Trouble Swallowing ,Denies  Sore Throat , Denies Change in hearing/vision/speech ,Denies Dizziness    Cardio: Denies  Chest Pain , Palpitations    Respiratory: Denies worsening of SOB, Denies Cough  Abdomen: See detailed HPI  Neuro: Denies Headache Denies Dizziness, Denies Paresthesias  MSK: Denies pain in Bones/Joints/Muscles   Psych: Patient denies depression, denies suicidal or homicidal ideations  Integ: Patient Denies rash, or new skin lesions       Vital Signs Last 24 Hrs  T(C): 38.4 (05 Mar 2024 04:32), Max: 38.4 (05 Mar 2024 04:32)  T(F): 101.2 (05 Mar 2024 04:32), Max: 101.2 (05 Mar 2024 04:32)  HR: 86 (05 Mar 2024 04:32) (56 - 91)  BP: 131/78 (05 Mar 2024 04:32) (131/78 - 157/91)  BP(mean): --  RR: 18 (05 Mar 2024 04:32) (12 - 20)  SpO2: 96% (05 Mar 2024 04:32) (94% - 99%)    Parameters below as of 04 Mar 2024 21:49  Patient On (Oxygen Delivery Method): room air      Physical Exam  Gen: NAD  Head: NC/AT, no visible deformity  ENT: PERRLA, Sclera Icteric   Cardio: S1/S2 No S3/S4, Regular  Resp: CTA B/L  Abdomen: Soft, ND/ minimal pain on deep palpation of RUQ  Neuro: AAOx3  Extremities: FROM x 4  Skin: Slight jaundice , no excoriation       Labs:               12.7   7.96  )-----------( 186      ( 01 Mar 2024 06:47 )             36.9       Auto Neutrophil %: 74.6 % (03-01-24 @ 06:47)  Auto Immature Granulocyte %: 0.1 % (03-01-24 @ 06:47)  Auto Neutrophil %: 86.9 % (02-29-24 @ 20:27)  Auto Immature Granulocyte %: 0.4 % (02-29-24 @ 20:27)    03-01    137  |  102  |  14  ----------------------------<  91  4.0   |  24  |  1.0      Calcium: 8.6 mg/dL (03-01-24 @ 06:47)      LFTs:             5.7  | 4.3  | 75       ------------------[170     ( 01 Mar 2024 06:47 )  3.6  | 3.7  | 215           Blood Gas Venous - Lactate: 2.0 mmol/L (03-01-24 @ 00:39)    Urinalysis Basic - ( 01 Mar 2024 06:47 )  Color: x / Appearance: x / SG: x / pH: x  Gluc: 91 mg/dL / Ketone: x  / Bili: x / Urobili: x   Blood: x / Protein: x / Nitrite: x   Leuk Esterase: x / RBC: x / WBC x   Sq Epi: x / Non Sq Epi: x / Bacteria: x      RADIOLOGY & ADDITIONAL STUDIES:  US Abdomen Upper Quadrant Right 02.29.24   IMPRESSION:  The gallbladder is distended with sludge without definite wall thickening.  Otherwise, unremarkable ultrasound.  CBD 4mm      CT Abdomen and Pelvis w/ Oral Cont and w/ IV Cont 02.29.24   IMPRESSION:    No evidence of bowel obstruction or pneumoperitoneum. The oral contrast   has reached the mid to distal small bowel without evidence of   extravasation.    Distended gallbladder with questionable circumferential wall thickening.   Please correlate with concurrent ultrasound.    Pancreatic lesions can be further evaluated with nonemergent outpatient   MRI with and without contrast. These most likely reflect IPMN

## 2024-03-06 NOTE — PROGRESS NOTE ADULT - ATTENDING COMMENTS
as above. POD#1 s/p robotic cholecystectomy with positive IOC and subsequent transgastric ERCP with sphinterotomy and release of 2 large gall stones; no stent placement    Reports pain at the 15mm trocar site for the transgastric ERCP. Passing flatus. Out of bed and ambulating; Voiding. Tolerating clears. Febrile overnight, likely inflammatory reaction from release of sludge/pus from CBD    Unasyn   Adv diet as tolerated  Trend WBC  Trend LFTs  Possible d/c if afebrile, tolerating diet  DVT ppx
as above. POD#2. Tmax 100.4, Afebrile this morning; Reports feeling much better; Bilirubins normal; WBC lateral; Observed with IS, splinting; encouraged to increase respiratory effort as likely has component of atelectasis after long case; Tolerating diet, +BM/FLatus     d/c if afebrile into the afternoon   c/w tylenol, will give PO Augmentin  multimodal pain control, abdominal binder, no NSAIDs  PPI per GI recs  follow up in the office in 1 week  DVT ppx  Strict return warnings provided (recurrent fever, N/V, increased abdominal pain, etc.)
as above. Pain improved. Possible avenues of operative course explained again including cholecystectomy with IOC +/- possible transgastric ERCP if IOC positive. He agreed to proceed.
pt examined  labs and images reviewed  discussed with him for 45 min    passing flatus and bm with enema  discussed diet plan     pain a little worse today       will give pain medication and montior
pt examined  labs and images reviewed  discussed with him for 45 min    passing flatus and bm with enema  discussed diet plan     pain much better today     for sarthak moncada

## 2024-03-06 NOTE — DISCHARGE NOTE NURSING/CASE MANAGEMENT/SOCIAL WORK - NSDCPEFALRISK_GEN_ALL_CORE
For information on Fall & Injury Prevention, visit: [email or web address] OR  [email or web address] OR  https://www.cdc.gov/steadi/patient.html

## 2024-03-07 LAB
CULTURE RESULTS: ABNORMAL
ORGANISM # SPEC MICROSCOPIC CNT: ABNORMAL
ORGANISM # SPEC MICROSCOPIC CNT: SIGNIFICANT CHANGE UP
SPECIMEN SOURCE: SIGNIFICANT CHANGE UP
SURGICAL PATHOLOGY STUDY: SIGNIFICANT CHANGE UP

## 2024-03-20 DIAGNOSIS — N39.0 URINARY TRACT INFECTION, SITE NOT SPECIFIED: ICD-10-CM

## 2024-03-20 DIAGNOSIS — Z79.899 OTHER LONG TERM (CURRENT) DRUG THERAPY: ICD-10-CM

## 2024-03-20 DIAGNOSIS — G89.29 OTHER CHRONIC PAIN: ICD-10-CM

## 2024-03-20 DIAGNOSIS — K80.30 CALCULUS OF BILE DUCT WITH CHOLANGITIS, UNSPECIFIED, WITHOUT OBSTRUCTION: ICD-10-CM

## 2024-03-20 DIAGNOSIS — Z98.0 INTESTINAL BYPASS AND ANASTOMOSIS STATUS: ICD-10-CM

## 2024-03-20 DIAGNOSIS — J44.9 CHRONIC OBSTRUCTIVE PULMONARY DISEASE, UNSPECIFIED: ICD-10-CM

## 2024-03-20 DIAGNOSIS — G47.33 OBSTRUCTIVE SLEEP APNEA (ADULT) (PEDIATRIC): ICD-10-CM

## 2024-03-20 DIAGNOSIS — Z98.84 BARIATRIC SURGERY STATUS: ICD-10-CM

## 2024-03-20 DIAGNOSIS — I10 ESSENTIAL (PRIMARY) HYPERTENSION: ICD-10-CM

## 2024-03-20 DIAGNOSIS — K85.10 BILIARY ACUTE PANCREATITIS WITHOUT NECROSIS OR INFECTION: ICD-10-CM

## 2024-03-22 ENCOUNTER — APPOINTMENT (OUTPATIENT)
Dept: SURGERY | Facility: CLINIC | Age: 49
End: 2024-03-22
Payer: MEDICARE

## 2024-03-22 VITALS
WEIGHT: 256 LBS | SYSTOLIC BLOOD PRESSURE: 122 MMHG | HEART RATE: 86 BPM | HEIGHT: 67 IN | BODY MASS INDEX: 40.18 KG/M2 | DIASTOLIC BLOOD PRESSURE: 76 MMHG | TEMPERATURE: 97 F | OXYGEN SATURATION: 98 %

## 2024-03-22 PROCEDURE — 99024 POSTOP FOLLOW-UP VISIT: CPT

## 2024-03-25 NOTE — ASSESSMENT
[FreeTextEntry1] : 47 yo male with hx of RYGB presents for follow up s/p robotic cholecystectomy with +IOC and subsequent transgastric ERCP with alternating diarrhea and constipation and fatigue  - will send kailash labs  - diarrhea may be due to sphincterotomy but will monitor closely - instructed to go to the ED if he can't maintain hydration - follow up in 2 weeks

## 2024-03-25 NOTE — PHYSICAL EXAM
[Normal] : affect appropriate [de-identified] : Soft, nontender, nondistended; incisions healing well

## 2024-03-25 NOTE — HISTORY OF PRESENT ILLNESS
[de-identified] : 47 yo male with hx of RYGB presents for follow up s/p robotic cholecystectomy with +IOC and subsequent transgastric ERCP. States he has intermitted pain at the LUQ port (ERCP port). Alternates between constipation and diarrhea. States he has been drinking without issues but still sometimes wakes up with concentrated urine. Clears up during the day. Still taking baclofen, gabapentin  Bariatric: Taking his MVI, B12, and iron. Feels tired

## 2024-04-05 ENCOUNTER — APPOINTMENT (OUTPATIENT)
Dept: SURGERY | Facility: CLINIC | Age: 49
End: 2024-04-05
Payer: MEDICARE

## 2024-04-05 VITALS
BODY MASS INDEX: 40.49 KG/M2 | SYSTOLIC BLOOD PRESSURE: 124 MMHG | DIASTOLIC BLOOD PRESSURE: 70 MMHG | HEIGHT: 67 IN | TEMPERATURE: 98 F | OXYGEN SATURATION: 99 % | WEIGHT: 258 LBS | HEART RATE: 89 BPM

## 2024-04-05 DIAGNOSIS — Z98.84 BARIATRIC SURGERY STATUS: ICD-10-CM

## 2024-04-05 DIAGNOSIS — K80.50 CALCULUS OF BILE DUCT W/OUT CHOLANGITIS OR CHOLECYSTITIS W/OUT OBSTRUCTION: ICD-10-CM

## 2024-04-05 PROCEDURE — 99024 POSTOP FOLLOW-UP VISIT: CPT

## 2024-04-15 PROBLEM — K80.50 CHOLEDOCHOLITHIASIS: Status: ACTIVE | Noted: 2024-03-25

## 2024-04-15 PROBLEM — Z98.84 HISTORY OF ROUX-EN-Y GASTRIC BYPASS: Status: ACTIVE | Noted: 2024-03-22

## 2024-04-15 NOTE — HISTORY OF PRESENT ILLNESS
[de-identified] : 49 yo male with hx of RYGB presents for follow up s/p robotic cholecystectomy with +IOC and subsequent transgastric ERCP.   Pain at the ERCP port has improved. We reviewed his bloodwork. LFTs all within normal limits including bilirubins. Hgb 13.1, no leukocytosis.

## 2024-04-15 NOTE — ASSESSMENT
[FreeTextEntry1] : 47 yo male with hx of RYGB presents for follow up s/p robotic cholecystectomy with +IOC and subsequent transgastric ERCP; pain improving, labs reviewed  - f/u for regular bariatric follow up  - will refer to Adv GI for follow up

## 2024-04-22 NOTE — H&P PST ADULT - NSANTHGENDERRD_ENT_A_CORE
Subjective:      44 y/o WF who is here for thyroid evaluation.     Interim: Thyroid stable  No issues  Stable    Reports did not feel well on Synthroid  Feels better back on levothyroxine    She was diagnosed with hypothyroidism for years  She had Menorrhagia, also had ablation.    10/14 TSH 14.04  Ft4 0.8    10/14 USG  FINDINGS: The right thyroid lobe measures 4.5 x 2.2 x 2.0 cm.    The left thyroid lobe measures 4.5 x 1.8 x 1.3 cm. The isthmus   measures 5 mm. Both thyroid demonstrate moderate heterogeneous   echogenicity. Heterogeneous color Doppler flow is noted   bilaterally. No well There circumscribed, discrete thyroid nodule   . No extrathyroidal mass.          Impression   IMPRESSION: Mild thyromegaly with heterogeneous echogenicity.   Clinical correlation for thyroiditis is recommended, particularly   Hashimoto's thyroiditis.      Mild, controlled    Initial  complaints: fatigue, cold intolerance, weight gain  History of obstructive symptoms:  difficulty swallowing No, changes in voice/hoarseness  No.    History of radiation to patient's neck:   No  Recent iodine exposure:  No  Family history includes no thyroid abnormalities.  Family history of thyroid cancer:  No    12/14  TSH 0.62   Tg Ab 308           LTX 50mcg  9/15 TSH 3.31  7.16 TSH 2.82  Levothyroxine 75mcg  4/17 TSH 6.02  Dose increased to 100mcg  10/17 TSH 1.23  10/18 TSH 0.31  10/19 TSH 0.69  9/20 TSH 2.79  11/21 TSH 0.45  2/23 TSH 0.49  3/24 TSH 0.45    She is dietician    Current Outpatient Medications   Medication Sig Dispense Refill    levothyroxine (SYNTHROID) 100 MCG tablet TAKE 1 TABLET DAILY        (AMNEAL)   Please do not give Brand Synthroid 90 tablet 3     No current facility-administered medications for this visit.       Review of Systems  Scanned, reviewed    Vitals:    04/22/24 1003   BP: 122/84   Pulse: 60   Resp: 14   SpO2: 96%       Constitutional: Well-developed, appears stated age, cooperative, in no acute  Yes

## 2024-04-30 ENCOUNTER — OUTPATIENT (OUTPATIENT)
Dept: OUTPATIENT SERVICES | Facility: HOSPITAL | Age: 49
LOS: 1 days | End: 2024-04-30
Payer: COMMERCIAL

## 2024-04-30 DIAGNOSIS — Z96.7 PRESENCE OF OTHER BONE AND TENDON IMPLANTS: Chronic | ICD-10-CM

## 2024-04-30 DIAGNOSIS — Z98.890 OTHER SPECIFIED POSTPROCEDURAL STATES: Chronic | ICD-10-CM

## 2024-04-30 DIAGNOSIS — K02.9 DENTAL CARIES, UNSPECIFIED: ICD-10-CM

## 2024-04-30 DIAGNOSIS — Z98.84 BARIATRIC SURGERY STATUS: Chronic | ICD-10-CM

## 2024-04-30 PROCEDURE — D0140: CPT

## 2024-04-30 PROCEDURE — D0220: CPT

## 2024-05-02 DIAGNOSIS — K05.6 PERIODONTAL DISEASE, UNSPECIFIED: ICD-10-CM

## 2024-06-06 ENCOUNTER — APPOINTMENT (OUTPATIENT)
Dept: ORTHOPEDIC SURGERY | Facility: CLINIC | Age: 49
End: 2024-06-06
Payer: OTHER MISCELLANEOUS

## 2024-06-06 DIAGNOSIS — G56.21 LESION OF ULNAR NERVE, RIGHT UPPER LIMB: ICD-10-CM

## 2024-06-06 DIAGNOSIS — G56.22 LESION OF ULNAR NERVE, LEFT UPPER LIMB: ICD-10-CM

## 2024-06-06 DIAGNOSIS — G56.02 CARPAL TUNNEL SYNDROME, LEFT UPPER LIMB: ICD-10-CM

## 2024-06-06 DIAGNOSIS — G56.01 CARPAL TUNNEL SYNDROME, RIGHT UPPER LIMB: ICD-10-CM

## 2024-06-06 PROCEDURE — 99213 OFFICE O/P EST LOW 20 MIN: CPT

## 2024-06-06 NOTE — ASSESSMENT
[FreeTextEntry1] : Patient has bilateral carpal tunnel syndrome bilateral cubital tunnel syndrome.  No further surgical intervention is necessary.  He is going to see me back on a 6-week basis.  He in terms of his hands at maximal medical improvement.

## 2024-06-06 NOTE — PHYSICAL EXAM
DMV form signed and placed in [facility].  Please mail back to patient as requested   [de-identified] : Patient is wearing a brace on his right hand.  Patient has good range of motion to the right hand and left hand.  There is no intrinsic wasting.  Sensation in the median and ulnar nerve distributions.

## 2024-06-06 NOTE — HISTORY OF PRESENT ILLNESS
[de-identified] : 48-year-old male has bilateral cubital tunnel syndrome bilateral carpal tunnel syndrome.  He underwent release.  He has a mild partial tempers ability to both hands.  He is not working.  He missed a visit because of confounding medical issues.

## 2024-07-16 ENCOUNTER — APPOINTMENT (OUTPATIENT)
Dept: ORTHOPEDIC SURGERY | Facility: CLINIC | Age: 49
End: 2024-07-16
Payer: OTHER MISCELLANEOUS

## 2024-07-16 DIAGNOSIS — G56.22 LESION OF ULNAR NERVE, LEFT UPPER LIMB: ICD-10-CM

## 2024-07-16 DIAGNOSIS — G56.21 LESION OF ULNAR NERVE, RIGHT UPPER LIMB: ICD-10-CM

## 2024-07-16 DIAGNOSIS — G56.02 CARPAL TUNNEL SYNDROME, LEFT UPPER LIMB: ICD-10-CM

## 2024-07-16 DIAGNOSIS — G56.01 CARPAL TUNNEL SYNDROME, RIGHT UPPER LIMB: ICD-10-CM

## 2024-07-16 PROCEDURE — 99213 OFFICE O/P EST LOW 20 MIN: CPT

## 2024-08-26 ENCOUNTER — APPOINTMENT (OUTPATIENT)
Dept: ORTHOPEDIC SURGERY | Facility: CLINIC | Age: 49
End: 2024-08-26
Payer: OTHER MISCELLANEOUS

## 2024-08-26 DIAGNOSIS — G56.21 LESION OF ULNAR NERVE, RIGHT UPPER LIMB: ICD-10-CM

## 2024-08-26 DIAGNOSIS — G56.01 CARPAL TUNNEL SYNDROME, RIGHT UPPER LIMB: ICD-10-CM

## 2024-08-26 DIAGNOSIS — G56.02 CARPAL TUNNEL SYNDROME, LEFT UPPER LIMB: ICD-10-CM

## 2024-08-26 DIAGNOSIS — G56.22 LESION OF ULNAR NERVE, LEFT UPPER LIMB: ICD-10-CM

## 2024-08-26 PROCEDURE — 99213 OFFICE O/P EST LOW 20 MIN: CPT

## 2024-08-26 NOTE — ASSESSMENT
[FreeTextEntry1] : Patient status post cubital tunnel decompressions.  Patient status post revision carpal tunnel releases.  No further treatments are being asked for regarding his hands.  I believe he is at maximal medical improvement.  He will see me back in 6 weeks.

## 2024-08-26 NOTE — HISTORY OF PRESENT ILLNESS
[de-identified] : 49-year-old male having a little bit of increase in numbness in his fingers.  Level of disability.  Not working.  He is at maximum medical improvement.

## 2024-08-26 NOTE — PHYSICAL EXAM
[de-identified] : Physical exam is unchanged.  Patient is wearing a brace.  Diminished sensation median and ulnar nerve distributions.

## 2024-09-09 ENCOUNTER — APPOINTMENT (OUTPATIENT)
Dept: SURGERY | Facility: CLINIC | Age: 49
End: 2024-09-09
Payer: COMMERCIAL

## 2024-09-09 VITALS
OXYGEN SATURATION: 97 % | WEIGHT: 285 LBS | HEIGHT: 67 IN | HEART RATE: 75 BPM | DIASTOLIC BLOOD PRESSURE: 84 MMHG | SYSTOLIC BLOOD PRESSURE: 130 MMHG | BODY MASS INDEX: 44.73 KG/M2

## 2024-09-09 DIAGNOSIS — M54.50 LOW BACK PAIN, UNSPECIFIED: ICD-10-CM

## 2024-09-09 DIAGNOSIS — G47.33 OBSTRUCTIVE SLEEP APNEA (ADULT) (PEDIATRIC): ICD-10-CM

## 2024-09-09 DIAGNOSIS — E66.9 OBESITY, UNSPECIFIED: ICD-10-CM

## 2024-09-09 DIAGNOSIS — J45.909 UNSPECIFIED ASTHMA, UNCOMPLICATED: ICD-10-CM

## 2024-09-09 PROCEDURE — 99213 OFFICE O/P EST LOW 20 MIN: CPT

## 2024-09-09 NOTE — PLAN
[FreeTextEntry1] : Plan: Follow up with Ortho to discuss PT and psych.          Focus on complete meals.          Blood work.           Will schedule a follow up with RD.          Will schedule a follow up with Dr. Saavedra.          RTO in 6 months.          Call with concerns.

## 2024-09-09 NOTE — HISTORY OF PRESENT ILLNESS
[de-identified] : Patient had surgery approximately 3 years ago. In March he saw our colleague Dr. Mcdonald and underwent a Cholecystectomy. He reported today that he has not been able to focus on selfcare after witnessing a traumatic event which has worsened his depression. He denies suicidal thoughts, plans or attempts and is actively engaged in therapy. He will discuss seeing a psychiatrist with his therapist at his next session. Denies reflux/heartburn/nausea. Taking 2 multivitamins with iron, 2 Viactiv chews and 1 vitamin b 12 daily. He has been using his CPAP machine but very infrequently. I discussed the benefits of using it with patient who expressed understanding and plans to resume nightly use. He reported that he was recently diagnosed with asthma and benefits from the use of a rescue inhaler. No improvement in back pain and he ambulates with a cane.

## 2024-09-09 NOTE — ASSESSMENT
[FreeTextEntry1] : RIGOBERTO LAKE is a 49- year male seen today for bariatric follow up visit. He has had 67 lbs. weight regain since his last visit in September 2023. He reports that he gets hungry 1-2 hours after his meal but does not appear to be meeting his caloric and protein goals. Breakfast is usually a slice of gluten free toast with peanut butter or eggs. Lunch - chicken/ground turkey with vegetables, mixed green salad with chicken breast or eggs. Recommended that he limit his eggs to 3 days/week and focus on eating a complete meal as he has not been eating much carbohydrate from his recall.  Dinner - Greek yogurt with granola, or high protein cereal with blueberries. Snacks - Fruits or a piece of chicken. Fluid intake is adequate with water, and warm teas. He is walking for exercise but cannot walk at a brisk pace because of his back pain. Recommended muscle strengthening exercises as tolerated.

## 2024-09-10 ENCOUNTER — APPOINTMENT (OUTPATIENT)
Dept: SURGERY | Facility: CLINIC | Age: 49
End: 2024-09-10

## 2024-09-10 VITALS
HEIGHT: 67 IN | SYSTOLIC BLOOD PRESSURE: 130 MMHG | BODY MASS INDEX: 44.73 KG/M2 | DIASTOLIC BLOOD PRESSURE: 84 MMHG | OXYGEN SATURATION: 97 % | WEIGHT: 285 LBS | HEART RATE: 75 BPM

## 2024-09-10 DIAGNOSIS — R05.3 CHRONIC COUGH: ICD-10-CM

## 2024-09-10 DIAGNOSIS — F32.A DEPRESSION, UNSPECIFIED: ICD-10-CM

## 2024-09-10 PROCEDURE — 99205 OFFICE O/P NEW HI 60 MIN: CPT

## 2024-09-10 PROCEDURE — G2211 COMPLEX E/M VISIT ADD ON: CPT | Mod: NC

## 2024-09-10 RX ORDER — BUPROPION HYDROCHLORIDE 150 MG/1
150 TABLET, EXTENDED RELEASE ORAL DAILY
Qty: 30 | Refills: 2 | Status: ACTIVE | COMMUNITY
Start: 2024-09-10 | End: 1900-01-01

## 2024-09-10 RX ORDER — ALBUTEROL SULFATE 90 UG/1
108 (90 BASE) INHALANT RESPIRATORY (INHALATION)
Qty: 8 | Refills: 0 | Status: ACTIVE | COMMUNITY
Start: 2024-08-15

## 2024-09-10 RX ORDER — SEMAGLUTIDE 0.5 MG/.5ML
0.5 INJECTION, SOLUTION SUBCUTANEOUS
Qty: 1 | Refills: 2 | Status: ACTIVE | COMMUNITY
Start: 2024-09-10 | End: 1900-01-01

## 2024-09-10 RX ORDER — SEMAGLUTIDE 0.25 MG/.5ML
0.25 INJECTION, SOLUTION SUBCUTANEOUS
Qty: 1 | Refills: 2 | Status: ACTIVE | COMMUNITY
Start: 2024-09-10 | End: 1900-01-01

## 2024-09-10 NOTE — HISTORY OF PRESENT ILLNESS
[de-identified] : Referred by:   HPI: __ year old female/male here for his/her initial medical weight loss appointment. PMHx is significant for   Weight History: Highest Weight: Lowest Weight: Current Weight:   Previous Weight Loss Efforts:   Previous use of AOMs:   Medications that may have contributed to weight gain:     Pertinent Labs:   Review of Systems:   Eating behaviors:     24 Hr Recall: B: L: D: Alisha:   Movement:   SocHx:   Sleep:   Mental Health:   Reproductive/Preventive Screenings:

## 2024-09-11 ENCOUNTER — NON-APPOINTMENT (OUTPATIENT)
Age: 49
End: 2024-09-11

## 2024-09-13 ENCOUNTER — NON-APPOINTMENT (OUTPATIENT)
Age: 49
End: 2024-09-13

## 2024-09-13 LAB
25(OH)D3 SERPL-MCNC: 42 NG/ML
ALBUMIN SERPL ELPH-MCNC: 4.5 G/DL
ALP BLD-CCNC: 70 U/L
ALT SERPL-CCNC: 20 U/L
ANION GAP SERPL CALC-SCNC: 11 MMOL/L
AST SERPL-CCNC: 22 U/L
BILIRUB SERPL-MCNC: 0.5 MG/DL
BUN SERPL-MCNC: 24 MG/DL
CALCIUM SERPL-MCNC: 9.6 MG/DL
CALCIUM SERPL-MCNC: 9.7 MG/DL
CHLORIDE SERPL-SCNC: 103 MMOL/L
CHOLEST SERPL-MCNC: 174 MG/DL
CO2 SERPL-SCNC: 25 MMOL/L
CREAT SERPL-MCNC: 0.9 MG/DL
EGFR: 105 ML/MIN/1.73M2
FERRITIN SERPL-MCNC: 139 NG/ML
FOLATE RBC-MCNC: 1303 NG/ML
GLUCOSE SERPL-MCNC: 91 MG/DL
HCT VFR BLD CALC: 43.8 %
HCT VFR BLD CALC: 45.5 %
HDLC SERPL-MCNC: 42 MG/DL
HGB BLD-MCNC: 14.9 G/DL
IRON SATN MFR SERPL: 34 %
IRON SERPL-MCNC: 94 UG/DL
LDLC SERPL CALC-MCNC: 114 MG/DL
MCHC RBC-ENTMCNC: 28.9 PG
MCHC RBC-ENTMCNC: 34 G/DL
MCV RBC AUTO: 85 FL
NONHDLC SERPL-MCNC: 132 MG/DL
PARATHYROID HORMONE INTACT: 43 PG/ML
PLATELET # BLD AUTO: 260 K/UL
PMV BLD AUTO: 0 /100 WBCS
PMV BLD: 9.9 FL
POTASSIUM SERPL-SCNC: 4.3 MMOL/L
PROT SERPL-MCNC: 7.7 G/DL
RBC # BLD: 5.15 M/UL
RBC # FLD: 12.9 %
SODIUM SERPL-SCNC: 139 MMOL/L
TIBC SERPL-MCNC: 276 UG/DL
TRIGL SERPL-MCNC: 90 MG/DL
UIBC SERPL-MCNC: 182 UG/DL
VIT B12 SERPL-MCNC: 660 PG/ML
WBC # FLD AUTO: 6.98 K/UL

## 2024-10-08 ENCOUNTER — APPOINTMENT (OUTPATIENT)
Dept: ORTHOPEDIC SURGERY | Facility: CLINIC | Age: 49
End: 2024-10-08
Payer: OTHER MISCELLANEOUS

## 2024-10-08 DIAGNOSIS — G56.22 LESION OF ULNAR NERVE, LEFT UPPER LIMB: ICD-10-CM

## 2024-10-08 DIAGNOSIS — G56.02 CARPAL TUNNEL SYNDROME, LEFT UPPER LIMB: ICD-10-CM

## 2024-10-08 DIAGNOSIS — G56.01 CARPAL TUNNEL SYNDROME, RIGHT UPPER LIMB: ICD-10-CM

## 2024-10-08 DIAGNOSIS — G56.21 LESION OF ULNAR NERVE, RIGHT UPPER LIMB: ICD-10-CM

## 2024-10-08 PROCEDURE — 99213 OFFICE O/P EST LOW 20 MIN: CPT

## 2024-10-10 ENCOUNTER — APPOINTMENT (OUTPATIENT)
Dept: SURGERY | Facility: CLINIC | Age: 49
End: 2024-10-10
Payer: COMMERCIAL

## 2024-10-10 PROCEDURE — 97802 MEDICAL NUTRITION INDIV IN: CPT | Mod: NC,95

## 2024-10-11 VITALS — BODY MASS INDEX: 45.52 KG/M2 | WEIGHT: 290 LBS | HEIGHT: 67 IN

## 2024-10-15 PROBLEM — G62.9 NEUROPATHY: Status: ACTIVE | Noted: 2024-10-15

## 2024-10-15 PROBLEM — F10.21 ALCOHOLISM IN RECOVERY: Status: ACTIVE | Noted: 2024-10-15

## 2024-10-15 PROBLEM — Z80.0 FAMILY HISTORY OF MALIGNANT NEOPLASM OF COLON: Status: ACTIVE | Noted: 2024-10-15

## 2024-10-15 PROBLEM — Z80.1 FAMILY HISTORY OF LUNG CANCER: Status: ACTIVE | Noted: 2024-10-15

## 2024-10-15 PROBLEM — G47.00 INSOMNIA: Status: ACTIVE | Noted: 2024-10-15

## 2024-10-15 PROBLEM — Z83.49 FAMILY HISTORY OF GOITER: Status: ACTIVE | Noted: 2024-10-15

## 2024-10-15 PROBLEM — Z82.49 FAMILY HISTORY OF ACUTE MYOCARDIAL INFARCTION: Status: ACTIVE | Noted: 2024-10-15

## 2024-10-15 PROBLEM — F41.9 ANXIETY: Status: ACTIVE | Noted: 2020-08-26

## 2024-10-15 PROBLEM — G89.29 CHRONIC PAIN: Status: ACTIVE | Noted: 2024-10-15

## 2024-10-16 ENCOUNTER — APPOINTMENT (OUTPATIENT)
Dept: SURGERY | Facility: CLINIC | Age: 49
End: 2024-10-16

## 2024-10-16 VITALS
DIASTOLIC BLOOD PRESSURE: 88 MMHG | WEIGHT: 290 LBS | HEIGHT: 67 IN | SYSTOLIC BLOOD PRESSURE: 142 MMHG | TEMPERATURE: 97 F | HEART RATE: 71 BPM | BODY MASS INDEX: 45.52 KG/M2 | OXYGEN SATURATION: 98 %

## 2024-10-16 PROCEDURE — G2211 COMPLEX E/M VISIT ADD ON: CPT | Mod: NC

## 2024-10-16 PROCEDURE — 99214 OFFICE O/P EST MOD 30 MIN: CPT

## 2024-10-16 RX ORDER — SEMAGLUTIDE 1.7 MG/.75ML
1.7 INJECTION, SOLUTION SUBCUTANEOUS
Qty: 1 | Refills: 2 | Status: ACTIVE | COMMUNITY
Start: 2024-10-16 | End: 1900-01-01

## 2024-11-07 ENCOUNTER — APPOINTMENT (OUTPATIENT)
Facility: CLINIC | Age: 49
End: 2024-11-07
Payer: MEDICAID

## 2024-11-07 DIAGNOSIS — J45.909 UNSPECIFIED ASTHMA, UNCOMPLICATED: ICD-10-CM

## 2024-11-07 DIAGNOSIS — G47.30 SLEEP APNEA, UNSPECIFIED: ICD-10-CM

## 2024-11-07 DIAGNOSIS — J45.998 OTHER ASTHMA: ICD-10-CM

## 2024-11-07 DIAGNOSIS — J44.89 OTHER SPECIFIED CHRONIC OBSTRUCTIVE PULMONARY DISEASE: ICD-10-CM

## 2024-11-07 PROCEDURE — 99204 OFFICE O/P NEW MOD 45 MIN: CPT

## 2024-11-07 RX ORDER — ALBUTEROL SULFATE 90 UG/1
108 (90 BASE) INHALANT RESPIRATORY (INHALATION)
Qty: 1 | Refills: 3 | Status: ACTIVE | COMMUNITY
Start: 2024-11-07 | End: 1900-01-01

## 2024-11-14 ENCOUNTER — APPOINTMENT (OUTPATIENT)
Dept: SURGERY | Facility: CLINIC | Age: 49
End: 2024-11-14
Payer: MEDICAID

## 2024-11-14 PROCEDURE — 97803 MED NUTRITION INDIV SUBSEQ: CPT | Mod: NC,95

## 2024-11-15 VITALS — BODY MASS INDEX: 44.73 KG/M2 | HEIGHT: 67 IN | WEIGHT: 285 LBS

## 2024-11-19 ENCOUNTER — APPOINTMENT (OUTPATIENT)
Dept: ORTHOPEDIC SURGERY | Facility: CLINIC | Age: 49
End: 2024-11-19
Payer: OTHER MISCELLANEOUS

## 2024-11-19 DIAGNOSIS — G56.21 LESION OF ULNAR NERVE, RIGHT UPPER LIMB: ICD-10-CM

## 2024-11-19 DIAGNOSIS — G56.02 CARPAL TUNNEL SYNDROME, LEFT UPPER LIMB: ICD-10-CM

## 2024-11-19 DIAGNOSIS — G56.01 CARPAL TUNNEL SYNDROME, RIGHT UPPER LIMB: ICD-10-CM

## 2024-11-19 DIAGNOSIS — G56.22 LESION OF ULNAR NERVE, LEFT UPPER LIMB: ICD-10-CM

## 2024-11-19 PROCEDURE — 99213 OFFICE O/P EST LOW 20 MIN: CPT

## 2024-12-02 ENCOUNTER — APPOINTMENT (OUTPATIENT)
Dept: SURGERY | Facility: CLINIC | Age: 49
End: 2024-12-02

## 2024-12-02 VITALS
OXYGEN SATURATION: 96 % | DIASTOLIC BLOOD PRESSURE: 80 MMHG | SYSTOLIC BLOOD PRESSURE: 124 MMHG | WEIGHT: 278 LBS | HEIGHT: 67 IN | BODY MASS INDEX: 43.63 KG/M2 | HEART RATE: 85 BPM

## 2024-12-02 DIAGNOSIS — F41.9 ANXIETY DISORDER, UNSPECIFIED: ICD-10-CM

## 2024-12-02 DIAGNOSIS — J44.89 OTHER SPECIFIED CHRONIC OBSTRUCTIVE PULMONARY DISEASE: ICD-10-CM

## 2024-12-02 DIAGNOSIS — F10.21 ALCOHOL DEPENDENCE, IN REMISSION: ICD-10-CM

## 2024-12-02 PROCEDURE — 99214 OFFICE O/P EST MOD 30 MIN: CPT

## 2024-12-02 PROCEDURE — G2211 COMPLEX E/M VISIT ADD ON: CPT | Mod: NC

## 2024-12-02 RX ORDER — BUPROPION HYDROCHLORIDE 300 MG/1
300 TABLET, EXTENDED RELEASE ORAL
Refills: 0 | Status: ACTIVE | COMMUNITY

## 2025-01-03 ENCOUNTER — APPOINTMENT (OUTPATIENT)
Dept: ORTHOPEDIC SURGERY | Facility: CLINIC | Age: 50
End: 2025-01-03
Payer: OTHER MISCELLANEOUS

## 2025-01-03 DIAGNOSIS — G56.21 LESION OF ULNAR NERVE, RIGHT UPPER LIMB: ICD-10-CM

## 2025-01-03 DIAGNOSIS — G56.02 CARPAL TUNNEL SYNDROME, LEFT UPPER LIMB: ICD-10-CM

## 2025-01-03 DIAGNOSIS — G56.01 CARPAL TUNNEL SYNDROME, RIGHT UPPER LIMB: ICD-10-CM

## 2025-01-03 DIAGNOSIS — G56.22 LESION OF ULNAR NERVE, LEFT UPPER LIMB: ICD-10-CM

## 2025-01-03 PROCEDURE — 99213 OFFICE O/P EST LOW 20 MIN: CPT

## 2025-01-16 ENCOUNTER — APPOINTMENT (OUTPATIENT)
Dept: SURGERY | Facility: CLINIC | Age: 50
End: 2025-01-16
Payer: MEDICAID

## 2025-01-16 PROCEDURE — 97803 MED NUTRITION INDIV SUBSEQ: CPT | Mod: NC,95

## 2025-02-03 ENCOUNTER — APPOINTMENT (OUTPATIENT)
Dept: SURGERY | Facility: CLINIC | Age: 50
End: 2025-02-03

## 2025-02-03 VITALS
BODY MASS INDEX: 42.53 KG/M2 | HEIGHT: 67 IN | TEMPERATURE: 97 F | SYSTOLIC BLOOD PRESSURE: 126 MMHG | WEIGHT: 271 LBS | OXYGEN SATURATION: 95 % | DIASTOLIC BLOOD PRESSURE: 70 MMHG | HEART RATE: 80 BPM

## 2025-02-03 PROCEDURE — 99214 OFFICE O/P EST MOD 30 MIN: CPT

## 2025-02-03 PROCEDURE — G2211 COMPLEX E/M VISIT ADD ON: CPT | Mod: NC

## 2025-02-03 RX ORDER — LIDOCAINE 5% 700 MG/1
5 PATCH TOPICAL
Qty: 30 | Refills: 0 | Status: ACTIVE | COMMUNITY
Start: 2025-01-06

## 2025-02-03 RX ORDER — MONTELUKAST 10 MG/1
10 TABLET, FILM COATED ORAL
Qty: 90 | Refills: 0 | Status: ACTIVE | COMMUNITY
Start: 2024-10-30

## 2025-02-11 ENCOUNTER — APPOINTMENT (OUTPATIENT)
Dept: ORTHOPEDIC SURGERY | Facility: CLINIC | Age: 50
End: 2025-02-11
Payer: OTHER MISCELLANEOUS

## 2025-02-11 DIAGNOSIS — G56.21 LESION OF ULNAR NERVE, RIGHT UPPER LIMB: ICD-10-CM

## 2025-02-11 DIAGNOSIS — G56.02 CARPAL TUNNEL SYNDROME, LEFT UPPER LIMB: ICD-10-CM

## 2025-02-11 DIAGNOSIS — G56.01 CARPAL TUNNEL SYNDROME, RIGHT UPPER LIMB: ICD-10-CM

## 2025-02-11 DIAGNOSIS — G56.22 LESION OF ULNAR NERVE, LEFT UPPER LIMB: ICD-10-CM

## 2025-02-11 PROCEDURE — 99213 OFFICE O/P EST LOW 20 MIN: CPT

## 2025-02-14 NOTE — DISCUSSION/SUMMARY
[de-identified] :  the patient's sutures were removed without any complications.  Patient tolerated procedure well.  Encouraged scar massage of postoperative wounds.  Encouraged gentle range of motion of the elbow and hand within his pain limits.  Encouraged patient to make a full fist.  He may return to normal activity within pain limits.  He understands that numbness/tingling may last up to 4-6 months postoperatively.  He also understands that anything experienced after 6 months May be permanent.\par   He will follow up as needed.
moderate
Rifampin Counseling: I discussed with the patient the risks of rifampin including but not limited to liver damage, kidney damage, red-orange body fluids, nausea/vomiting and severe allergy.

## 2025-02-25 ENCOUNTER — APPOINTMENT (OUTPATIENT)
Facility: CLINIC | Age: 50
End: 2025-02-25

## 2025-03-26 ENCOUNTER — APPOINTMENT (OUTPATIENT)
Dept: ORTHOPEDIC SURGERY | Facility: CLINIC | Age: 50
End: 2025-03-26

## 2025-04-07 ENCOUNTER — APPOINTMENT (OUTPATIENT)
Dept: ORTHOPEDIC SURGERY | Facility: CLINIC | Age: 50
End: 2025-04-07
Payer: OTHER MISCELLANEOUS

## 2025-04-07 DIAGNOSIS — G56.01 CARPAL TUNNEL SYNDROME, RIGHT UPPER LIMB: ICD-10-CM

## 2025-04-07 DIAGNOSIS — G56.21 LESION OF ULNAR NERVE, RIGHT UPPER LIMB: ICD-10-CM

## 2025-04-07 DIAGNOSIS — G56.22 LESION OF ULNAR NERVE, LEFT UPPER LIMB: ICD-10-CM

## 2025-04-07 DIAGNOSIS — G56.02 CARPAL TUNNEL SYNDROME, LEFT UPPER LIMB: ICD-10-CM

## 2025-04-07 PROCEDURE — 99243 OFF/OP CNSLTJ NEW/EST LOW 30: CPT

## 2025-04-18 ENCOUNTER — NON-APPOINTMENT (OUTPATIENT)
Age: 50
End: 2025-04-18

## 2025-04-18 RX ORDER — SEMAGLUTIDE 1.7 MG/.75ML
1.7 INJECTION, SOLUTION SUBCUTANEOUS
Qty: 1 | Refills: 2 | Status: ACTIVE | COMMUNITY
Start: 2025-04-18 | End: 1900-01-01

## 2025-06-18 ENCOUNTER — NON-APPOINTMENT (OUTPATIENT)
Age: 50
End: 2025-06-18

## 2025-09-09 ENCOUNTER — APPOINTMENT (OUTPATIENT)
Dept: ORTHOPEDIC SURGERY | Facility: CLINIC | Age: 50
End: 2025-09-09
Payer: OTHER MISCELLANEOUS

## 2025-09-09 PROCEDURE — 99075 MEDICAL TESTIMONY: CPT
